# Patient Record
Sex: MALE | Race: WHITE | NOT HISPANIC OR LATINO | Employment: FULL TIME | URBAN - METROPOLITAN AREA
[De-identification: names, ages, dates, MRNs, and addresses within clinical notes are randomized per-mention and may not be internally consistent; named-entity substitution may affect disease eponyms.]

---

## 2023-03-19 ENCOUNTER — APPOINTMENT (OUTPATIENT)
Dept: CT IMAGING | Facility: CLINIC | Age: 43
DRG: 965 | End: 2023-03-19
Attending: EMERGENCY MEDICINE
Payer: COMMERCIAL

## 2023-03-19 ENCOUNTER — HOSPITAL ENCOUNTER (INPATIENT)
Facility: CLINIC | Age: 43
LOS: 2 days | Discharge: HOME | DRG: 965 | End: 2023-03-22
Attending: EMERGENCY MEDICINE | Admitting: STUDENT IN AN ORGANIZED HEALTH CARE EDUCATION/TRAINING PROGRAM
Payer: COMMERCIAL

## 2023-03-19 ENCOUNTER — APPOINTMENT (OUTPATIENT)
Dept: MRI IMAGING | Facility: CLINIC | Age: 43
DRG: 965 | End: 2023-03-19
Attending: NURSE PRACTITIONER
Payer: COMMERCIAL

## 2023-03-19 DIAGNOSIS — S22.061A CLOSED STABLE BURST FRACTURE OF EIGHTH THORACIC VERTEBRA, INITIAL ENCOUNTER (H): ICD-10-CM
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
## 2023-03-19 PROCEDURE — 250N000013 HC RX MED GY IP 250 OP 250 PS 637: Performed by: PHYSICIAN ASSISTANT

## 2023-03-19 PROCEDURE — 96375 TX/PRO/DX INJ NEW DRUG ADDON: CPT

## 2023-03-19 PROCEDURE — 99221 1ST HOSP IP/OBS SF/LOW 40: CPT | Performed by: NURSE PRACTITIONER

## 2023-03-19 PROCEDURE — G0378 HOSPITAL OBSERVATION PER HR: HCPCS

## 2023-03-19 PROCEDURE — 250N000013 HC RX MED GY IP 250 OP 250 PS 637: Performed by: EMERGENCY MEDICINE

## 2023-03-19 PROCEDURE — 99285 EMERGENCY DEPT VISIT HI MDM: CPT | Mod: 25

## 2023-03-19 PROCEDURE — 96376 TX/PRO/DX INJ SAME DRUG ADON: CPT

## 2023-03-19 PROCEDURE — 250N000011 HC RX IP 250 OP 636: Performed by: PHYSICIAN ASSISTANT

## 2023-03-19 PROCEDURE — 71260 CT THORAX DX C+: CPT

## 2023-03-19 PROCEDURE — 99223 1ST HOSP IP/OBS HIGH 75: CPT | Mod: AI | Performed by: PHYSICIAN ASSISTANT

## 2023-03-19 PROCEDURE — 72146 MRI CHEST SPINE W/O DYE: CPT

## 2023-03-19 PROCEDURE — 250N000011 HC RX IP 250 OP 636: Performed by: EMERGENCY MEDICINE

## 2023-03-19 PROCEDURE — 96374 THER/PROPH/DIAG INJ IV PUSH: CPT | Mod: 59

## 2023-03-19 PROCEDURE — 72128 CT CHEST SPINE W/O DYE: CPT

## 2023-03-19 PROCEDURE — 250N000009 HC RX 250: Performed by: EMERGENCY MEDICINE

## 2023-03-19 RX ORDER — IOPAMIDOL 755 MG/ML
500 INJECTION, SOLUTION INTRAVASCULAR ONCE
Status: COMPLETED | OUTPATIENT
Start: 2023-03-19 | End: 2023-03-19

## 2023-03-19 RX ORDER — IBUPROFEN 200 MG
200 TABLET ORAL EVERY 6 HOURS PRN
Status: ON HOLD | COMMUNITY
End: 2023-03-22

## 2023-03-19 RX ORDER — PROCHLORPERAZINE 25 MG
25 SUPPOSITORY, RECTAL RECTAL EVERY 12 HOURS PRN
Status: DISCONTINUED | OUTPATIENT
Start: 2023-03-19 | End: 2023-03-22 | Stop reason: HOSPADM

## 2023-03-19 RX ORDER — HYDROMORPHONE HYDROCHLORIDE 1 MG/ML
0.3 INJECTION, SOLUTION INTRAMUSCULAR; INTRAVENOUS; SUBCUTANEOUS EVERY 4 HOURS PRN
Status: DISCONTINUED | OUTPATIENT
Start: 2023-03-19 | End: 2023-03-22

## 2023-03-19 RX ORDER — AMOXICILLIN 250 MG
1 CAPSULE ORAL 2 TIMES DAILY
Status: DISCONTINUED | OUTPATIENT
Start: 2023-03-20 | End: 2023-03-22 | Stop reason: HOSPADM

## 2023-03-19 RX ORDER — ACETAMINOPHEN 500 MG
1000 TABLET ORAL ONCE
Status: COMPLETED | OUTPATIENT
Start: 2023-03-19 | End: 2023-03-19

## 2023-03-19 RX ORDER — PROCHLORPERAZINE MALEATE 5 MG
10 TABLET ORAL EVERY 6 HOURS PRN
Status: DISCONTINUED | OUTPATIENT
Start: 2023-03-19 | End: 2023-03-22 | Stop reason: HOSPADM

## 2023-03-19 RX ORDER — KETOROLAC TROMETHAMINE 15 MG/ML
15 INJECTION, SOLUTION INTRAMUSCULAR; INTRAVENOUS ONCE
Status: COMPLETED | OUTPATIENT
Start: 2023-03-19 | End: 2023-03-19

## 2023-03-19 RX ORDER — CYCLOBENZAPRINE HCL 10 MG
10 TABLET ORAL ONCE
Status: COMPLETED | OUTPATIENT
Start: 2023-03-19 | End: 2023-03-19

## 2023-03-19 RX ORDER — CYCLOBENZAPRINE HCL 10 MG
10 TABLET ORAL 3 TIMES DAILY
Status: DISCONTINUED | OUTPATIENT
Start: 2023-03-20 | End: 2023-03-22 | Stop reason: HOSPADM

## 2023-03-19 RX ORDER — POLYETHYLENE GLYCOL 3350 17 G/17G
17 POWDER, FOR SOLUTION ORAL DAILY
Status: DISCONTINUED | OUTPATIENT
Start: 2023-03-20 | End: 2023-03-22 | Stop reason: HOSPADM

## 2023-03-19 RX ORDER — OXYCODONE HYDROCHLORIDE 5 MG/1
5-10 TABLET ORAL ONCE
Status: COMPLETED | OUTPATIENT
Start: 2023-03-19 | End: 2023-03-19

## 2023-03-19 RX ORDER — ACETAMINOPHEN 500 MG
1000 TABLET ORAL EVERY 8 HOURS
Status: DISCONTINUED | OUTPATIENT
Start: 2023-03-20 | End: 2023-03-22 | Stop reason: HOSPADM

## 2023-03-19 RX ORDER — ONDANSETRON 4 MG/1
4 TABLET, ORALLY DISINTEGRATING ORAL EVERY 6 HOURS PRN
Status: DISCONTINUED | OUTPATIENT
Start: 2023-03-19 | End: 2023-03-22 | Stop reason: HOSPADM

## 2023-03-19 RX ORDER — OXYCODONE HYDROCHLORIDE 5 MG/1
5-10 TABLET ORAL EVERY 4 HOURS PRN
Status: DISCONTINUED | OUTPATIENT
Start: 2023-03-19 | End: 2023-03-22 | Stop reason: HOSPADM

## 2023-03-19 RX ORDER — AMOXICILLIN 250 MG
2 CAPSULE ORAL 2 TIMES DAILY
Status: DISCONTINUED | OUTPATIENT
Start: 2023-03-20 | End: 2023-03-22 | Stop reason: HOSPADM

## 2023-03-19 RX ORDER — HYDROMORPHONE HYDROCHLORIDE 1 MG/ML
0.5 INJECTION, SOLUTION INTRAMUSCULAR; INTRAVENOUS; SUBCUTANEOUS ONCE
Status: COMPLETED | OUTPATIENT
Start: 2023-03-19 | End: 2023-03-19

## 2023-03-19 RX ORDER — ONDANSETRON 2 MG/ML
4 INJECTION INTRAMUSCULAR; INTRAVENOUS EVERY 6 HOURS PRN
Status: DISCONTINUED | OUTPATIENT
Start: 2023-03-19 | End: 2023-03-22 | Stop reason: HOSPADM

## 2023-03-19 RX ORDER — KETOROLAC TROMETHAMINE 30 MG/ML
30 INJECTION, SOLUTION INTRAMUSCULAR; INTRAVENOUS EVERY 8 HOURS
Status: DISCONTINUED | OUTPATIENT
Start: 2023-03-20 | End: 2023-03-20

## 2023-03-19 RX ADMIN — HYDROMORPHONE HYDROCHLORIDE 0.5 MG: 1 INJECTION, SOLUTION INTRAMUSCULAR; INTRAVENOUS; SUBCUTANEOUS at 13:36

## 2023-03-19 RX ADMIN — ACETAMINOPHEN 1000 MG: 500 TABLET ORAL at 13:35

## 2023-03-19 RX ADMIN — CYCLOBENZAPRINE 10 MG: 10 TABLET, FILM COATED ORAL at 13:35

## 2023-03-19 RX ADMIN — HYDROMORPHONE HYDROCHLORIDE 0.3 MG: 1 INJECTION, SOLUTION INTRAMUSCULAR; INTRAVENOUS; SUBCUTANEOUS at 22:27

## 2023-03-19 RX ADMIN — OXYCODONE HYDROCHLORIDE 10 MG: 5 TABLET ORAL at 18:40

## 2023-03-19 RX ADMIN — IOPAMIDOL 100 ML: 755 INJECTION, SOLUTION INTRAVENOUS at 14:31

## 2023-03-19 RX ADMIN — CYCLOBENZAPRINE 10 MG: 10 TABLET, FILM COATED ORAL at 19:48

## 2023-03-19 RX ADMIN — OXYCODONE HYDROCHLORIDE 10 MG: 5 TABLET ORAL at 22:21

## 2023-03-19 RX ADMIN — SODIUM CHLORIDE 63 ML: 9 INJECTION, SOLUTION INTRAVENOUS at 14:31

## 2023-03-19 RX ADMIN — KETOROLAC TROMETHAMINE 15 MG: 15 INJECTION, SOLUTION INTRAMUSCULAR; INTRAVENOUS at 13:35

## 2023-03-19 ASSESSMENT — ENCOUNTER SYMPTOMS
NUMBNESS: 0
HEADACHES: 0
BACK PAIN: 1
VOMITING: 0
NECK PAIN: 0

## 2023-03-19 ASSESSMENT — ACTIVITIES OF DAILY LIVING (ADL)
ADLS_ACUITY_SCORE: 39
ADLS_ACUITY_SCORE: 35
ADLS_ACUITY_SCORE: 39

## 2023-03-19 NOTE — ED TRIAGE NOTES
Pt arrives via EMS from [facility] for a fall while skiing. Reports the snow is very icy and he lost control. Complains of pain in thoracic area pain in his back and right rib pain. History of L4-L5 back surgery. EMS placed 18G left AC and gave 0.5mg dilaudid.

## 2023-03-19 NOTE — ED PROVIDER NOTES
History     Chief Complaint:  Back Pain     The history is provided by the patient and the spouse.      Itz Jurado is a healthy 42 year old male who presents with his wife for evaluation of back pain. Boris was skiing today and fell, landing on his buttocks then his back. He was wearing a helmet and did not hit his head or lose consciousness. He immediately had midline thoracic back pain, worse with movement or deep inspiration. This pain is 9/10 after 0.5 mg of Dilaudid from paramedics. He has not ambulated since the injury. He denies numbness, tingling, headache, neck pain, vision changes, or vomiting.     Independent Historian:    Spouse/partner    Review of External Notes: No records available      ROS:  Review of Systems   Constitutional: Negative for activity change.   Eyes: Negative for visual disturbance.   Gastrointestinal: Negative for vomiting.   Musculoskeletal: Positive for back pain. Negative for neck pain.   Skin: Negative for wound.   Neurological: Negative for syncope, numbness and headaches.   All other systems reviewed and are negative.    Allergies:  Morphine  Penicillins     Medications:    The patient is currently on no regular medications.    Past Medical History:    No significant past medical history    Past Surgical History:    L4-L5 fusion      Social History:  The patient presents to the ED via EMS  Wife is at bedside.  They have 2 children.  Enjoys skiing   He moved here in 2020 and has not established primary care    Physical Exam     Patient Vitals for the past 24 hrs:   BP Temp Temp src Pulse Resp SpO2 Height Weight   03/19/23 2030 115/69 -- -- 87 -- -- -- --   03/19/23 2000 138/88 -- -- 84 -- 96 % -- --   03/19/23 1930 132/88 -- -- 86 -- -- -- --   03/19/23 1900 135/82 -- -- 84 -- 97 % -- --   03/19/23 1850 -- -- -- -- -- 94 % -- --   03/19/23 1848 -- -- -- -- -- 96 % -- --   03/19/23 1847 -- -- -- -- -- 95 % -- --   03/19/23 1846 -- -- -- -- -- 95 % -- --   03/19/23 1845 --  "-- -- -- -- 95 % -- --   03/19/23 1844 -- -- -- -- -- 95 % -- --   03/19/23 1843 -- -- -- -- -- 97 % -- --   03/19/23 1841 136/80 -- -- 81 -- 97 % -- --   03/19/23 1730 -- -- -- -- -- 98 % -- --   03/19/23 1729 -- -- -- -- -- 98 % -- --   03/19/23 1728 -- -- -- -- -- 98 % -- --   03/19/23 1727 -- -- -- -- -- 98 % -- --   03/19/23 1726 -- -- -- -- -- 97 % -- --   03/19/23 1724 -- -- -- -- -- 98 % -- --   03/19/23 1723 -- -- -- -- -- 98 % -- --   03/19/23 1722 -- -- -- -- -- 98 % -- --   03/19/23 1703 -- -- -- -- -- 97 % -- --   03/19/23 1702 -- -- -- -- -- 97 % -- --   03/19/23 1701 -- -- -- -- -- 97 % -- --   03/19/23 1700 134/87 -- -- 79 -- 97 % -- --   03/19/23 1545 132/83 -- -- 68 -- 97 % -- --   03/19/23 1343 -- -- -- -- -- 94 % -- --   03/19/23 1342 -- 97.6  F (36.4  C) Oral -- -- 95 % -- --   03/19/23 1341 (!) 140/94 -- -- 71 -- 95 % -- --   03/19/23 1305 (!) 136/93 (!) 96.7  F (35.9  C) Temporal 64 18 98 % 1.93 m (6' 4\") 111.1 kg (245 lb)      Physical Exam  General: Well-developed and well-nourished.  Uncomfortable appearing middle-aged  man. Cooperative.  Head:  Atraumatic.  Eyes:  Conjunctivae, lids, and sclerae are normal.  ENT:    Normal nose. Moist mucous membranes.  Neck:  Supple. Normal range of motion.  No midline tenderness.  CV:  Regular rate and rhythm. Normal heart sounds with no murmurs, rubs, or gallops detected.  Resp:  No respiratory distress. Clear to auscultation bilaterally without decreased breath sounds, wheezing, rales, or rhonchi.  GI:  Soft. Non-distended. Non-tender.    MS:  Normal ROM. No bilateral lower extremity edema.  No reproducible tenderness to the posterior ribs bilaterally.  No step-offs.  Midline tenderness to the mid thoracic spine without lumbar spine tenderness.  Skin:  Warm. Non-diaphoretic. No pallor.  Neuro: Awake. A&Ox3. Normal strength.  Normal sensation to light touch throughout including the thoracoabdominal wall.  Psych: Normal mood and affect. Normal " speech.  Vitals reviewed.    Emergency Department Course   Imaging:  Chest CT w IV contrast only, TRAUMA / DISSECTION   Final Result   IMPRESSION:    1.  Please separate report of the thoracic spine CT for details about the T8 vertebral body fracture and prevertebral soft tissue swelling.   2.  Mild linear and patchy opacities in the right lung apex and posterior right lung, may be due to atelectasis and possible pulmonary contusion.   3.  No other acute findings or traumatic injury in the chest.      CT Thoracic Spine w/o Contrast   Final Result   IMPRESSION:   1.  Acute burst fracture of T8 with surrounding perivertebral soft tissue injury. Recommend spine surgery consultation.   2.  Mild osseous retropulsion without high-grade spinal canal stenosis. Mild-moderate T8-T9 foraminal foraminal narrowing.             MR Thoracic Spine w/o Contrast    (Results Pending)   XR Thoracic Spine 2 Views    (Results Pending)     Report per radiology    Emergency Department Course & Assessments:  Interventions:  Medications   oxyCODONE (ROXICODONE) tablet 5-10 mg (10 mg Oral $Given 3/19/23 2221)   HYDROmorphone (PF) (DILAUDID) injection 0.3 mg (0.3 mg Intravenous $Given 3/19/23 2227)   cyclobenzaprine (FLEXERIL) tablet 10 mg (10 mg Oral $Given 3/19/23 1335)   HYDROmorphone (PF) (DILAUDID) injection 0.5 mg (0.5 mg Intravenous $Given 3/19/23 1336)   acetaminophen (TYLENOL) tablet 1,000 mg (1,000 mg Oral $Given 3/19/23 1335)   ketorolac (TORADOL) injection 15 mg (15 mg Intravenous $Given 3/19/23 1335)   oxyCODONE (ROXICODONE) tablet 5-10 mg (10 mg Oral $Given 3/19/23 1840)   cyclobenzaprine (FLEXERIL) tablet 10 mg (10 mg Oral $Given 3/19/23 1948)      Independent Interpretation (X-rays, CTs, rhythm strip):  Independently reviewing CT scans, appears to be T-8 compression fracture with no significant retropulsion.     Consultations/Discussion of Management or Tests:  1610 I spoke with Gisella Mcneal NP of the neurosurgery service,  regarding patient's presentation, findings, and plan of care.     1715 I spoke with Gisella Mcneal again.    1907  I spoke with Gisella Mcneal regarding plan for TLSO.    1924 I spoke with SHARONA Ambrose of the hospitalist service, regarding patient's presentation, findings, and plan of care.     2007 I spoke with Gisella Mcneal regarding plan for MRI.    Social Determinants of Health affecting care:  , has kids.  No established care providers.     Assessments:  1320 I examined the patient and obtained history.     1602 I rechecked Boris, updated on CT results. His pain was improved to a 5/10.     1910 Rechecked Boris. He would prefer to be admitted to the hospital for pain control.     Disposition:  The patient was admitted to the hospital under the care of Dr. Solis.     Impression & Plan    Medical Decision Making:  Boris is a healthy 42-year-old man who had immediate onset of midline thoracic back pain after he fell onto his buttocks and back while skiing just prior to arrival.  He denies any other injury including head injury.  He appears uncomfortable, though well, on exam.  He does have midline tenderness to the mid thoracic spine without significant tenderness elsewhere including over the ribs or lumbar spine.  He is neurologically intact without change to strength or sensation.    There are some linear patchy opacities in the right lung on chest CT which may be pulmonary contusion.  The more notable finding is thoracic spine CT revealing a burst fracture of T8 with surrounding paravertebral soft tissue injury with mild osseous retropulsion without high-grade spinal canal stenosis.  I contacted neurosurgery NP Gisella Mcneal who evaluated the patient in the emergency department.  She recommended TLSO followed by upright x-ray and follow-up in their clinic in 6 weeks.  Later she also recommended MRI of the thoracic spine.  We will not be able to facilitate completion of TLSO and MRI in the  emergency department tonight.  Boris agrees with plan for admission to complete these tasks and for pain control as his pain persists despite oxycodone, Dilaudid, Flexeril, Tylenol, and Toradol.  I updated him and his wife and answered all their questions.  I spoke with Carmela Diaz, hospitalist PA, who accepts admission and has no further orders.    Diagnosis:    ICD-10-CM    1. Injury while downhill skiing  Y93.23       2. Closed stable burst fracture of eighth thoracic vertebra, initial encounter (H)  S22.061A          Scribe Disclosure:  I, Yosef Tabares, am serving as a scribe at 1:28 PM on 3/19/2023 to document services personally performed by Rach Reeves MD based on my observations and the provider's statements to me.    3/19/2023   Rach Reeves MD Dixson, Kylie S, MD  03/20/23 0952       Rach Reeves MD  03/20/23 0953

## 2023-03-19 NOTE — ED NOTES
Assisted with Pt. Ambulance triage. Applied monitoring devices (BP, and pulse ox) onto Patient.

## 2023-03-20 ENCOUNTER — DOCUMENTATION ONLY (OUTPATIENT)
Dept: ORTHOPEDICS | Facility: CLINIC | Age: 43
End: 2023-03-20
Payer: COMMERCIAL

## 2023-03-20 PROBLEM — S06.4XAA EPIDURAL HEMATOMA (H): Status: ACTIVE | Noted: 2023-03-20

## 2023-03-20 PROCEDURE — 120N000001 HC R&B MED SURG/OB

## 2023-03-20 PROCEDURE — 250N000013 HC RX MED GY IP 250 OP 250 PS 637: Performed by: PHYSICIAN ASSISTANT

## 2023-03-20 PROCEDURE — 250N000011 HC RX IP 250 OP 636: Performed by: PHYSICIAN ASSISTANT

## 2023-03-20 PROCEDURE — 99231 SBSQ HOSP IP/OBS SF/LOW 25: CPT | Performed by: PHYSICIAN ASSISTANT

## 2023-03-20 PROCEDURE — 96376 TX/PRO/DX INJ SAME DRUG ADON: CPT

## 2023-03-20 PROCEDURE — G0378 HOSPITAL OBSERVATION PER HR: HCPCS

## 2023-03-20 RX ADMIN — OXYCODONE HYDROCHLORIDE 10 MG: 5 TABLET ORAL at 20:56

## 2023-03-20 RX ADMIN — ACETAMINOPHEN 1000 MG: 500 TABLET ORAL at 23:51

## 2023-03-20 RX ADMIN — CYCLOBENZAPRINE 10 MG: 10 TABLET, FILM COATED ORAL at 08:51

## 2023-03-20 RX ADMIN — ACETAMINOPHEN 1000 MG: 500 TABLET ORAL at 08:51

## 2023-03-20 RX ADMIN — ACETAMINOPHEN 1000 MG: 500 TABLET ORAL at 16:09

## 2023-03-20 RX ADMIN — OXYCODONE HYDROCHLORIDE 10 MG: 5 TABLET ORAL at 03:18

## 2023-03-20 RX ADMIN — HYDROMORPHONE HYDROCHLORIDE 0.3 MG: 1 INJECTION, SOLUTION INTRAMUSCULAR; INTRAVENOUS; SUBCUTANEOUS at 06:03

## 2023-03-20 RX ADMIN — CYCLOBENZAPRINE 10 MG: 10 TABLET, FILM COATED ORAL at 13:18

## 2023-03-20 RX ADMIN — KETOROLAC TROMETHAMINE 30 MG: 30 INJECTION, SOLUTION INTRAMUSCULAR; INTRAVENOUS at 00:16

## 2023-03-20 RX ADMIN — OXYCODONE HYDROCHLORIDE 5 MG: 5 TABLET ORAL at 11:44

## 2023-03-20 RX ADMIN — OXYCODONE HYDROCHLORIDE 10 MG: 5 TABLET ORAL at 16:34

## 2023-03-20 RX ADMIN — ACETAMINOPHEN 1000 MG: 500 TABLET ORAL at 00:16

## 2023-03-20 RX ADMIN — CYCLOBENZAPRINE 10 MG: 10 TABLET, FILM COATED ORAL at 20:57

## 2023-03-20 RX ADMIN — HYDROMORPHONE HYDROCHLORIDE 0.3 MG: 1 INJECTION, SOLUTION INTRAMUSCULAR; INTRAVENOUS; SUBCUTANEOUS at 14:40

## 2023-03-20 ASSESSMENT — ENCOUNTER SYMPTOMS
ACTIVITY CHANGE: 0
WOUND: 0

## 2023-03-20 ASSESSMENT — ACTIVITIES OF DAILY LIVING (ADL)
ADLS_ACUITY_SCORE: 26

## 2023-03-20 NOTE — PROGRESS NOTES
S: Boris Jurado was seen at [facility], Room 233-01, for the evaluation and measurements for a custom TLSO.     Dx: T8 Burst Fracture    O: A Custom TLSO is medically necessary and recommended by Neurosurgery due to severity and instability of spinal fracture.     A: Measurements were completed without incident. Custom [employer] TLSO with a Flex Foam liner and rigid thermoplastic external frame.      P: The TLSO will be fabricated and delivered, pending any unforeseen circumstances, by Tuesday afternoon (03/21/2022).  If possible, the TLSO will be delivered earlier.    At the time of delivery, the TLSO will be fit to the patient and all donning/doffing and care instructions will be provided to the patient.    G: The TLSO increases medial-lateral, rotational and anterior-posterior stability of the spine.  The TLSO also applies compression to the patient s soft tissues.  Compression of the patient s soft tissues serves to unload the injured vertebrae which reduces pain and promotes healing.  The goal of this orthosis is to provide spinal stability, promote healing and reduce pain while the patient performs their ADLs.    Electronically signed by Rei Alexander CPO, ANTOINE    How to Put on a TLSO Back Brace  For optimal fit brace should NOT be donned with patient sitting. Ideal fit is achieved by donning in either laying or standing position. Due to changes in belly tissue, it is difficult to achieve a proper fit when patient is sitting.  1. Apply a snug-fitting cotton t-shirt.  Loose shirts may cause wrinkles and skin irritation.  2. Patient should be supine. Palpate for waist (below ribs but above hips) so you know where to line up waist grooves of the brace.  3. Logroll patient and slide back section of brace under patient lining up waist groove of brace with patient's waist.  4. Roll patient onto their back with the posterior section behind them. Recheck alignment of waist groove on brace with patient's  waist. It may be necessary to logroll patient to the opposite side to get posterior section centered on patient, where it extends anteriorly equal amounts on patient's sides. Repeat logrolling side to side as necessary.  5. Once posterior section is positioned correctly, lay anterior section on patient. Again, line up waist groove of brace to patient's waist. Waist grooves of anterior and posterior section should match up and fit together. Anterior shell should go over the top of posterior shell. Velcro straps should line up with the chafes/D-rings, if they don't, either the anterior or the posterior sections are not in the proper place.  6. Fasten the middle straps first and tighten both sides evenly. Then fasten either top or bottom straps in the same manner. Brace should be snug so as to prevent brace from sliding up on patient. If it is too loose, the brace will slide up and cause discomfort to the patient in the chin and/or axilla area.  7. When properly fit, brace the inferior aspect should allow clearance so as not to cut into the tops of the thighs when sitting but needs to be as low on the pelvic area as possible.    Wearing Schedule  Always check with prescribing doctor for a precise wearing schedule. At times the TLSO is worn only when out of bed while sitting or standing. Other times, the doctor requires the TLSO to be worn at all times.    Cleaning and Maintenance  Rubbing alcohol may be used to clean the inside and outside of the TLSO. Spray TLSO with alcohol and wipe gently with a cloth. Be sure that TLSO is completely dry prior to application to ensure no skin issues will occur. Always wear a clean, dry, snug-fitting shirt under the brace.    Tips and Problem Solving  Brace migration is inevitable, especially when patient is going from laying to upright in bed. The mattress may push posterior section of brace up, which will push the whole brace up. Migration may also occur when patient sits in a soft  cushioned chair. Don't be afraid to readjust brace and pull it down and back to its proper position.    Avoid soft seat cushioned chairs and sit up straight. Soft seat cushions or leaning back into a chair will cause the brace to migrate upward and may place pressure underarms.      Instead of leaning back, try sitting on the front half of the seat of the chair and work your way back until you contact the backrest of the chair.  Sometimes using a pillow in the gap between the back of the chair and the brace will provide more support.    Do not lean forward over a table while eating. Bring the food up to the mouth. This will reduce any pressure on the thighs and chest.    If the TLSO starts to migrate upward under the throat/armpits, ensure that straps are snug. Straps that are loose will allow the brace to shift.

## 2023-03-20 NOTE — PLAN OF CARE
ROOM # 233    Living Situation : Independent @Home  Facility name:  : [facility] (wife)    Activity level at baseline: IND  Activity level on admit: A X 2    Who will be transporting you at discharge: Wife    Patient registered to observation; given Patient Bill of Rights; given the opportunity to ask questions about observation status and their plan of care.  Patient has been oriented to the observation room, bathroom and call light is in place.    Discussed discharge goals and expectations with patient/family.

## 2023-03-20 NOTE — PROGRESS NOTES
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 He presented with back pain, imaging confirms acute burst fracture of T8 vertebral body.     Acute medical issues:  Thoracic Burst Fracture T8  Perivertebral Hematoma   Immediate back pain following fall on buttocks at moderate speed down hill skiing. Fortunately intact neurologic status.  He was wearing helmet, no head injury or pain elsewhere.   CT thoracic spine with Acute burst fracture of T8 vertebral body with mild retropulsion of posterior cortex, moderate anteropulsion, and breech through the endplates with approximately 60% height loss relative to the noncompressed adjacent vertebrae. Traumatic perivertebral swelling and hematoma spanning from T6 through T9.     -- Admitted to Obs  -- NS consult. Appreciate their assistance. Planning for nonoperative management for now.  Follow up with NS in 6 weeks as OP with repeat plain imaging.  -- Orthotics consult for TLSO (ordered 3/20/2023) due to be delivered 3/21/23.  Will obtain imaging at that time to ensure stability and then have PT evaluate.    -- BR for now. May elevate HOB to 20 degrees when eating, etc.    -- Neuro checks per protocol.    -- Multimodal pain management.     Urinary retention  Required I/O cath last pm for urinary retention. Due to void.  Does not correlate with fracture.  No urinary or bowel incontinence or priapism.  -- Monitor. I/O cath PRN.     Pulmonary contusion  CT chest with mild linear and patch opacities in the right lung apex and posterior right lung concerning for atelectasis vs pulmonary contusion. Not hypoxic.  No respiratory issues at present.    -- Pulmonary toilet.    -- PRN O2.            Diet: Regular Diet Adult    DVT Prophylaxis: High risk for " "bleeding. PADUA score 3  Rich Catheter: Not present  Lines: None     Cardiac Monitoring: None  Code Status: Full Code      Clinically Significant Risk Factors Present on Admission                       # Overweight: Estimated body mass index is 29.58 kg/m  as calculated from the following:    Height as of this encounter: 1.93 m (6' 4\").    Weight as of this encounter: 110.2 kg (243 lb).    Complicates cares.        Disposition Plan  pending TLSO brace and mobility/pain control.          The patient's care was discussed with the Bedside Nurse, Care Coordinator/, Patient, Patient's Family and NS Consultant(s).    BABATUNDE [facility]  Hospitalist Service  [facility]  Securely message with [employer] (more info)  Text page via [employer] Paging/Directory   ______________________________________________________________________    Interval History   No CP or SOB.  No new neurological deficits.  Highly motivated. Pain control appropriate.    Fitted with customized TLSO brace with final fitting (don/doff) in the next day or so.    Has not urinated since yesterday after being I/O cath but does endorse minimal PO intake. Encourage PO intake and monitor.  Anticipate as mobility increases, retention and decreased urinary output issues will resolve.d     Physical Exam   Vital Signs: Temp: 97.8  F (36.6  C) Temp src: Oral BP: 125/73 Pulse: 73   Resp: 16 SpO2: 94 % O2 Device: None (Room air)    Weight: 243 lbs 0 oz    GEN:   Alert, oriented x 3, appears comfortable, NAD.  NECK:   Supple ,no mass or thyromegaly   HEENT:  Normocephalic/atraumatic, no scleral icterus, no nasal discharge, mouth moist.  CV:   Regular rate and rhythm, no murmur or JVD.  S1 + S2 noted, no S3 or S4.  LUNGS:   Clear to auscultation bilaterally without rales/rhonchi/wheezing/retractions.  Symmetric chest rise on inhalation noted.  ABD:   Active bowel sounds, soft, non-tender/non-distended.  No " rebound/guarding/rigidity.  EXT:   No edema.  No cyanosis.  No joint synovitis noted.  SKIN:   Dry to touch, no exanthems noted in the visualized areas.  Neurologic: Grossly intact,non focal. Spine: TTP T8.  ALVAREZ, 5/5 strength BLE.  Negative ankle clonus.   Neuropsychiatric:  General: normal, calm and normal eye contact  Level of consciousness: alert / normal  Affect: normal  Orientation: oriented to self, place, time and situation     Medical Decision Making       45 MINUTES SPENT BY ME on the date of service doing chart review, history, exam, documentation & further activities per the note.      Data         Imaging results reviewed over the past 24 hrs:   Recent Results (from the past 24 hour(s))   CT Thoracic Spine w/o Contrast    Narrative    EXAM: CT THORACIC SPINE W/O CONTRAST  LOCATION: [facility]  DATE/TIME: 3/19/2023 2:46 PM    INDICATION: Trauma fall.  COMPARISON: None.  TECHNIQUE: Routine CT Thoracic Spine without IV contrast. Multiplanar reformats. Dose reduction techniques were used.     FINDINGS:  VERTEBRA: Mild upper thoracic dextrocurvature and straightening of kyphosis is most likely related to positioning and/or muscle spasm. Acute burst fracture of T8 vertebral body with mild retropulsion of posterior cortex, moderate anteropulsion, and   breech through the endplates with approximately 60% height loss relative to the noncompressed adjacent vertebrae. Other vertebral body heights are intact. Facets are intact.    CANAL/FORAMINA: Disc spaces are maintained. Probable indentation of the ventral thecal sac at T8. Mild left and moderate right foraminal stenosis at T8-T9. No high-grade spinal canal stenosis.    PARASPINAL: Traumatic perivertebral swelling and hematoma spanning from T6 through T9. Please refer to the separately dictated CT report regarding details of the chest .      Impression    IMPRESSION:  1.  Acute burst fracture of T8 with surrounding perivertebral soft tissue  injury. Recommend spine surgery consultation.  2.  Mild osseous retropulsion without high-grade spinal canal stenosis. Mild-moderate T8-T9 foraminal foraminal narrowing.        Chest CT w IV contrast only, TRAUMA / DISSECTION    Narrative    EXAM: CT CHEST W CONTRAST  LOCATION: [facility]  DATE/TIME: 3/19/2023 2:47 PM    INDICATION: chest rib pain after fall skiing, tenderness posterolateral right inferior ribs   eval fx ptx, etc  COMPARISON: None.  TECHNIQUE: CT chest with IV contrast. Multiplanar reformats were obtained. Dose reduction techniques were used.    CONTRAST: 100mL Isovue 370    FINDINGS:   LUNGS AND PLEURA: Linear and patchy opacities in the posterior right lung and right lung apex, likely atelectasis and possible pulmonary contusion. No pneumothorax, hemothorax or pleural effusion. Mild left basilar atelectasis. No suspicious pulmonary   nodules or masses.    MEDIASTINUM/AXILLAE: No lymphadenopathy. No mediastinal hematoma. No central pulmonary emboli. No thoracic aortic aneurysm or traumatic aortic injury. Prevertebral soft tissue thickening at the T8 vertebral level, related to the T8 vertebral body   fracture.    CORONARY ARTERY CALCIFICATION: Mild.    UPPER ABDOMEN: No significant finding.    MUSCULOSKELETAL: Please see report of the thoracic spine CT for details about the T8 vertebral body fracture. No other fractures visualized..      Impression    IMPRESSION:   1.  Please separate report of the thoracic spine CT for details about the T8 vertebral body fracture and prevertebral soft tissue swelling.  2.  Mild linear and patchy opacities in the right lung apex and posterior right lung, may be due to atelectasis and possible pulmonary contusion.  3.  No other acute findings or traumatic injury in the chest.   MR Thoracic Spine w/o Contrast    Narrative    EXAM: MR THORACIC SPINE W/O CONTRAST  LOCATION: [facility]  DATE/TIME: 3/19/2023 11:35  PM    INDICATION: T8 fracture, radiating pain to right ribs.  COMPARISON: CT thoracic spine from same day.  TECHNIQUE: Routine Thoracic Spine MRI without IV contrast.    FINDINGS:   Again seen is an acute burst type compression fracture deformity with marrow edema obliquely tracking across the T8 vertebral body. There is stable appearing ventral and dorsal osseous retropulsion. Dorsal osseous retropulsion extends approximately 4 mm   into the spinal canal where the dorsal osseous fragment contacts the ventral aspect of the thoracic spinal cord and causes mild to moderate central spinal canal stenosis. No definite associated spinal cord edema is present at this level. Below the level   of T8 there is subtle increased signal within the central spinal cord, suggesting prominence of the central canal. There is a thin amount of ventral epidural hematoma at the level of T8 measuring up to 2 mm in AP dimensions and approximately 3.1 cm in   craniocaudal dimensions. The epidural hematoma extends down to the level of the inferior T9 vertebral body ventrally. Epidural hematoma appears asymmetrically prominent to the left at this level there does also noted dorsally, extending down to level of   T10 and superiorly up to the level of T6. There is mild associated thecal sac stenosis.    Along the posterior vertebral body of T8, there is thinning of the posterior longitudinal ligament without reena disruption identified. The anterior longitudinal ligament at T8 is not well appreciated.    Marrow edema is noted tracking into the left pedicle at T8 and base of the right pedicle at T8. Additionally, there is a small amount of posttraumatic marrow edema identified involving the inferolateral right posterior corner of T7 and anterior/inferior   corner of T7.    Bilateral anterolateral paraspinal edema/hematoma is present centered at T8.    Edema is noted involving the T8-T9 interspinous space, suggesting interspinous ligamentous  injury.    A small amount of fluid signal is identified involving the bilateral T8-T9 facet joints, possibly reflecting capsular injury. Small amount of edema is also noted involving bilateral T7-T8 facet joints.    The remaining vertebral body heights and marrow signal pattern are maintained and normal for patient's age.     Mild multilevel disc desiccation and disc height loss. No disc herniations identified. No high-grade foraminal stenosis identified on the left. On the right at T8-T9 there is moderate neural foraminal stenosis. No high-grade spinal canal stenosis on a   degenerative basis.     Bilateral pleural fluid collections. Nonspecific patchy opacity involving the right lower lobe.    Subcentimeter cystic lesion within the dorsal right hepatic lobe likely reflects a biliary cyst versus hamartoma in the absence of known malignancy.      Impression    IMPRESSION:  1.  Stable burst type compression fracture deformity at T8 with persistent dorsal osseous retropulsion causing mild to moderate central spinal canal stenosis.  2.  Epidural hematoma centered at T8 causing mild thecal sac stenosis.  3.  Mild posttraumatic edema along the inferior endplate of T7.  4.  Interspinous ligamentous injury at T8-T9.  5.  No definite abnormal spinal cord signal.  6.  Additional findings above.        Dr. Shiv Beltre discussed results with Dr. Covarrubias on 03/20/2023 at 1:08 AM.

## 2023-03-20 NOTE — PROGRESS NOTES
Patient is a 42-year-old male with history of L4-5 fusion who was admitted earlier this evening following a injury sustained during skiing.  Presenting with back pain and found to have an acute burst fracture of T8 vertebral body on CT thoracic spine.  Cross cover notified of abnormal MRI and I spoke with Dr. Beltre from [place] [facility] regarding the findings.  He stated that there is an epidural hematoma at T8 causing mild to moderate thecal sac stenosis along with some dorsal osseous retropulsion causing mild to moderate central spinal canal stenosis at the T8 burst fracture.  The patient currently does not have any numbness or tingling or weakness involving his legs.  -We have paged on-call neurosurgery service, I am awaiting a return phone call to discuss the MRI findings    Neurosurgery did not return phone call.  Again patient has no neurologic deficits at this time.  -Discontinued IV Toradol order and placed order for no NSAIDs at this time    ADDENDUM:  Spoke with neurosurgery on-call.  No acute intervention necessary at this time given he is neurologically intact.  Their service will again see the patient later this morning in follow-up.  Agree with no NSAIDs.

## 2023-03-20 NOTE — PLAN OF CARE
PRIMARY DIAGNOSIS: fracture   OUTPATIENT/OBSERVATION GOALS TO BE MET BEFORE DISCHARGE:  1. Pain Status: Improved-controlled with oral pain medications.    2. Return to near baseline physical activity: No    3. Cleared for discharge by consultants (if involved): No    Discharge Planner Nurse   Safe discharge environment identified: Yes  Barriers to discharge: Yes       Entered by: Reema Conway RN 03/20/2023    Please review provider order for any additional goals.   Nurse to notify provider when observation goals have been met and patient is ready for discharge.    Pt A&O x4. IV SL. On RA, denies sob. Using IS in room independently, tolerating well. On regular diet, tolerating well. Laying flat in bed. Pt was able to void independently into urinal. Oral oxy effective for 10/10 pain per pt. Pt got measured for a TLSO brace, plan for brace placement tomorrow. Plan for thoracic XR once brace is in place. Currently resting in bed, able to make needs known.

## 2023-03-20 NOTE — ED NOTES
[facility]  ED Nurse Handoff Report    Itz Jurado is a 42 year old male   ED Chief complaint: Back Pain  . ED Diagnosis:   Final diagnoses:   None     Allergies:   Allergies   Allergen Reactions     Morphine Unknown     Penicillins        Code Status: Full Code  Activity level - Baseline/Home:  Independent. Activity Level - Current:   Assist X 2. Lift room needed: No. Bariatric: No   Needed: No   Isolation: No. Infection: Not Applicable.     Vital Signs:   Vitals:    03/19/23 1846 03/19/23 1847 03/19/23 1848 03/19/23 1850   BP:       Pulse:       Resp:       Temp:       TempSrc:       SpO2: 95% 95% 96% 94%   Weight:       Height:           Cardiac Rhythm:  ,      Pain level:    Patient confused: No. Patient Falls Risk: No.   Elimination Status: Unable to void   Patient Report - Initial Complaint: 42 year old male with a history of L4-L5 fusion who presents with his wife for evaluation of back pain. Boris reports that he was skiing today and fell landing on his back/butt. He denies hitting his head or losing consciousness. He states he has 9/10 pain in the middle of his back in the thoracic region. The pain is worse when he takes a breath and moves. He was not able to get up and has not walked since the fall. He denies any numbness, tingling, headache, neck pain, vision changes or vomiting. . Focused Assessment: pt comfortable laying in bed flat at this time.    Tests Performed: Labs Ordered and Resulted from Time of ED Arrival to Time of ED Departure - No data to display  . Abnormal Results:   Chest CT w IV contrast only, TRAUMA / DISSECTION   Final Result   IMPRESSION:    1.  Please separate report of the thoracic spine CT for details about the T8 vertebral body fracture and prevertebral soft tissue swelling.   2.  Mild linear and patchy opacities in the right lung apex and posterior right lung, may be due to atelectasis and possible pulmonary contusion.   3.  No other acute findings or  traumatic injury in the chest.      CT Thoracic Spine w/o Contrast   Final Result   IMPRESSION:   1.  Acute burst fracture of T8 with surrounding perivertebral soft tissue injury. Recommend spine surgery consultation.   2.  Mild osseous retropulsion without high-grade spinal canal stenosis. Mild-moderate T8-T9 foraminal foraminal narrowing.               .   Treatments provided: see mar, notes and flowsheets  Family Comments: n/a  OBS brochure/video discussed/provided to patient:  Yes  ED Medications:   Medications   cyclobenzaprine (FLEXERIL) tablet 10 mg (10 mg Oral $Given 3/19/23 1335)   HYDROmorphone (PF) (DILAUDID) injection 0.5 mg (0.5 mg Intravenous $Given 3/19/23 1336)   acetaminophen (TYLENOL) tablet 1,000 mg (1,000 mg Oral $Given 3/19/23 1335)   ketorolac (TORADOL) injection 15 mg (15 mg Intravenous $Given 3/19/23 1335)   sodium chloride for CT scan flush use (63 mLs Intravenous $Given 3/19/23 1431)   iopamidol (ISOVUE-370) solution 500 mL (100 mLs Intravenous $Given 3/19/23 1431)   oxyCODONE (ROXICODONE) tablet 5-10 mg (10 mg Oral $Given 3/19/23 1840)   cyclobenzaprine (FLEXERIL) tablet 10 mg (10 mg Oral $Given 3/19/23 1948)     Drips infusing:  No  For the majority of the shift, the patient's behavior Green. Interventions performed were n/a.    Sepsis treatment initiated: No     Patient tested for COVID 19 prior to admission: NO    ED Nurse Name/Phone Number: Pina Vickers RN,   7:50 PM    RECEIVING UNIT ED HANDOFF REVIEW    Above ED Nurse Handoff Report was reviewed: Yes  Reviewed by: Noman Bender RN on March 19, 2023 at 9:10 PM

## 2023-03-20 NOTE — PROVIDER NOTIFICATION
12:05 AM  Message: [facility] requesting a call back [phone] Dr Beltre

## 2023-03-20 NOTE — H&P
[facility]    History and Physical - Hospitalist Service       Date of Admission:  3/19/2023    Assessment & Plan Itz Jurado is a 42 year old male with PMHx of lumbar 4-5 fusion, who was admitted on 3/19/2023 after a fall down hill skiing. He presented with back pain, imaging confirms acute burst fracture of T8 vertebral body.    Thoracic Burst Fracture  Perivertebral Hematoma   Immediate back pain following fall on buttocks at moderate speed down hill skiing. Fortunately intact neurologic status.  He was wearing helmet, no head injury or pain elsewhere.   CT thoracic spine with Acute burst fracture of T8 vertebral body with mild retropulsion of posterior cortex, moderate anteropulsion, and breech through the endplates with approximately 60% height loss relative to the noncompressed adjacent vertebrae. Traumatic perivertebral swelling and hematoma spanning from T6 through T9. CT Chest Mild linear and patchy opacities in the right lung apex and posterior right lung, may be due to atelectasis and possible pulmonary contusion.  - In the ED neurosurgery was consulted who recommended establishment of TLSO bracing with upright x-rays.  Unfortunately I am told that they were unable to get a TLSO brace for the patient in the emergency department tonight so they wanted observation admission until upright xrays could be performed in TLSO brace. This would determine further management including surgical plans  - activity: bedrest, flat until cleared by NSG in brace  - orthotics consult for TLSO  - analgesia  - bladder scan PRN, monitor neurologic status   - monitor spo2, encourage IS for possible pulm contusion         Diet:  Regular     DVT Prophylaxis: Pneumatic Compression Devices  Rich Catheter: Not present    Cardiac Monitoring: None    Code Status:   Full code.     Clinically Significant Risk Factors Present on Admission                       # Overweight: Estimated body mass index is 29.82  "kg/m  as calculated from the following:    Height as of this encounter: 1.93 m (6' 4\").    Weight as of this encounter: 111.1 kg (245 lb).           Disposition Plan      Expected Discharge Date: 03/20/2023         to home.      The patient's care was discussed with the ED Team.    Carmela Diaz PA-C  Hospitalist Service  [facility]  Securely message with [employer] (more info)  Text page via [facility] Paging/Directory     ______________________________________________________________________    Chief Complaint   \"back pain\"    History is obtained from the patient    History of Present Illness   Itz Jurado is a 42 year old male who was brought to the emergency department for evaluation of back pain.  He was skiing this afternoon with his daughter that he was passing through an icy patch at a moderate speed when he fell on his buttocks and then ultimately on his back.  He developed immediate back pain and was concerned as he has history of a lumbar fusion.   were able to put him onto a gurney and bring him to EMS for ultimately hospital evaluation.  Since the injury he had not been able to get up due to pain.  He has pain in the middle of his back that is exacerbated with breathing and any movement.  Back pain level currently is similar to what he had after his spinal surgery rated an 8 or 9 out of 10.    He has no weakness in his legs or arms, numbness or tingling.  No headache, vision changes.  No incontinence.  He has history of lumbar fusion L4-L5 in 2017.  No history of osteoporosis or other fractures.  He denies any chronic medical problems, does not take daily medications. Denies history of issues with surgery.  Does not have any current establish primary care.  He was wearing a helmet when he fell and did not hit his head.  He denies any neck pain.      On presentation vital signs are stable.  No lab work obtained. CT Chest with contrast showed Mild linear and patchy " opacities in the right lung apex and posterior right lung, may be due to atelectasis and possible pulmonary contusion.  CT Thoracic spine imaging Acute burst fracture of T8 with surrounding perivertebral soft tissue injury, mild osseous retropulsion without high-grade spinal canal stenosis, T8-T9 foraminal narrowing.    In the ED neurosurgery was consulted who recommended establishment of TLSO bracing with upright x-rays.  Unfortunately I am told that they were unable to get a TLSO brace for the patient in the emergency department tonight so they wanted observation admission for this to be set up.    Admission was requested from hospitalist service for [employer] burst fracture    Past Medical History    As noted above.    Past Surgical History   L4-5 fusion, 2017    Prior to Admission Medications   None        Review of Systems    The 10 point Review of Systems is negative other than noted in the HPI or here.     Social History   Reviewed, lives with wife and family. No tobacco use history.     Physical Exam   Vital Signs: Temp: 97.6  F (36.4  C) Temp src: Oral BP: 136/80 Pulse: 81   Resp: 18 SpO2: 94 % O2 Device: None (Room air)    Weight: 245 lbs 0 oz    Constitutional: Awake, alert,  no apparent distress.  Eyes: Conjunctiva and pupils examined and normal.  HEENT: Moist mucous membranes, normal dentition.  Respiratory: Clear to auscultation bilaterally, no crackles or wheezing.  Cardiovascular: Regular rate and rhythm, normal S1 and S2, and no murmur noted.  GI: Soft, non-distended, non-tender, bowel sounds present. No rebound tenderness or guarding.  Lymph/Hematologic: No anterior cervical or supraclavicular adenopathy.  Skin: No rashes, no cyanosis, no edema.  Musculoskeletal: Pain with palpation spinous process thoracic region.  No visible paraspinal muscle spasms.  He is moving all extremities in bed with no gross deformities. Lower extremity plantarflexion and dorsiflexion is 5 out of 5 symmetric  bilaterally.  Neurologic: No focal deficits noted. Speech is clear. Coordination and strength grossly normal.   Psychiatric: Appropriate affect.       Medical Decision Making       75 MINUTES SPENT BY ME on the date of service doing chart review, history, exam, documentation & further activities per the note.      Data   ------------------------- PAST 24 HR DATA REVIEWED -----------------------------------------------

## 2023-03-20 NOTE — PLAN OF CARE
PRIMARY DIAGNOSIS: ACUTE BACK PAIN  OUTPATIENT/OBSERVATION GOALS TO BE MET BEFORE DISCHARGE:  1. Pain Status: Improved-controlled with I V Dilaudid medications.    2. Return to near baseline physical activity: No    3. Cleared for discharge by consultants (if involved): No    Vitals are Temp: 98  F (36.7  C) Temp src: Oral BP: 112/77 Pulse: 85   Resp: 18 SpO2: 94 %.    Patient is Alert and Oriented x4. Bed bound for the shift. Pt is a on Regular diet. Saline locked.  Bladder scan x 2, most recent 43 ml.  Plan:manage pain,consult for TLSO brace today  Discharge Planner Nurse   Safe discharge environment identified: No  Barriers to discharge: Yes       Entered by: Noman Bender RN 03/20/2023 5:51 AM     Please review provider order for any additional goals.   Nurse to notify provider when observation goals have been met and patient is ready for discharge.

## 2023-03-20 NOTE — PLAN OF CARE
PRIMARY DIAGNOSIS: fracture   OUTPATIENT/OBSERVATION GOALS TO BE MET BEFORE DISCHARGE:  1. Pain Status: Improved-controlled with oral pain medications.    2. Return to near baseline physical activity: No    3. Cleared for discharge by consultants (if involved): No    Discharge Planner Nurse   Safe discharge environment identified: Yes  Barriers to discharge: Yes       Entered by: Reema Conway RN 03/20/2023    Please review provider order for any additional goals.   Nurse to notify provider when observation goals have been met and patient is ready for discharge.    Pt A&O x4. IV SL. On RA, denies sob. On regular diet, tolerating well. Laying flat in bed. Pt bladder scanned, 481 in bladder, pt will attempt to use urinal. Pt got measured for a TLSO brace, plan for brace placement tomorrow. Currently resting in bed, able to make needs known.

## 2023-03-20 NOTE — CONSULTS
[facility]    Neurosurgery Consultation     Date of Admission:  3/19/2023  Date of Consult (When I saw the patient): 03/19/23    Assessment & Plan   Itz Jurado is a 42 year old male with history of L4-5 fusion (2017 in [place]) who was admitted on 3/19/2023 with thoracic back pain s/p fall while skiing. Radiographic findings include an acute burst fracture of T8 vertebral body with mild retropulsion and approximately 60% height loss. Patient reports thoracic back pain radiates to right sided ribs.     Plan:   - Thoracic spine MRI ordered due to radicular pain   - Orthotics consult for TLSO brace    - Upright thoracic spine xray while wearing brace   - Recommend to wear brace when out of bed. Avoid heavy lifting, bending, twisting.   - Continue pain control measures as needed   - Likely plan to follow up with NSG clinic in 6 weeks with xray prior   - Appreciate assistance from specialties    I have discussed the following assessment and plan with Dr. Adams.     Gisella Mcneal, CNP  [facility] [place] [address]  Tel [phone]  Pager [phone]    Code Status    No Order    Reason for Consult   Reason for consult: I was asked by Dr. Reeves to evaluate this patient for T8 fracture.    Primary Care Physician   Physician No Ref-Primary    Chief Complaint   Thoracic back pain s/p fall while skiing     History is obtained from the patient, electronic health record and emergency department physician    History of Present Illness   Itz Jurado is a 42 year old male with history of L4-5 fusion (2017 in [place]) who was admitted on 3/19/2023 with thoracic back pain s/p fall while skiing. Patient reports he was skiing today at [facility], lost control, fell, and landed on his buttocks and back. He developed thoracic back pain and was brought to the ED for further evaluation. Radiographic findings include an  "acute burst fracture of T8 vertebral body with mild retropulsion and approximately 60% height loss. Patient denies leg pain, numbness, or weakness. Patient reports thoracic back pain radiates to right sided ribs.     EXAM: CT THORACIC SPINE W/O CONTRAST  LOCATION: [facility]  DATE/TIME: 3/19/2023 2:46 PM  IMPRESSION:  1.  Acute burst fracture of T8 with surrounding perivertebral soft tissue injury. Recommend spine surgery consultation.  2.  Mild osseous retropulsion without high-grade spinal canal stenosis. Mild-moderate T8-T9 foraminal foraminal narrowing.       Past Medical History   I have reviewed this patient's medical history and updated it with pertinent information if needed.   No past medical history on file.    Past Surgical History   I have reviewed this patient's surgical history and updated it with pertinent information if needed.  No past surgical history on file.    Prior to Admission Medications   None     Allergies   Allergies   Allergen Reactions     Morphine Unknown     Penicillins        Social History   I have reviewed this patient's social history and updated it with pertinent information if needed. Itz Jurado      Family History   I have reviewed this patient's family history and updated it with pertinent information if needed.   No family history on file.    Review of Systems   10 point ROS negative other than symptoms noted in HPI.    Physical Exam   Temp: 97.6  F (36.4  C) Temp src: Oral BP: 136/80 Pulse: 81   Resp: 18 SpO2: 94 % O2 Device: None (Room air)    Vital Signs with Ranges  Temp:  [96.7  F (35.9  C)-97.6  F (36.4  C)] 97.6  F (36.4  C)  Pulse:  [64-81] 81  Resp:  [18] 18  BP: (132-140)/(80-94) 136/80  SpO2:  [94 %-98 %] 94 %  245 lbs 0 oz     , Blood pressure 136/80, pulse 81, temperature 97.6  F (36.4  C), temperature source Oral, resp. rate 18, height 1.93 m (6' 4\"), weight 111.1 kg (245 lb), SpO2 94 %.  245 lbs 0 oz    NEUROLOGICAL EXAMINATION: "   Mental status:  Alert and Oriented x 3, speech is fluent.  Motor:    Shoulder Abduction  Right:  5/5   Left:  5/5  Biceps                      Right:  5/5   Left:  5/5  Triceps                     Right:  5/5   Left:  5/5  Wrist Extensors        Right:  5/5   Left:  5/5  Wrist Flexors            Right:  5/5   Left:  5/5  Intrinsics                   Right:  5/5   Left:  5/5    Iliopsoas  (hip flexion)               Right: 5/5  Left:  5/5  Quadriceps  (knee extension)       Right:  5/5  Left:  5/5  Hamstrings  (knee flexion)            Right:  5/5  Left:  5/5  Gastroc Soleus  (PF)                          Right:  5/5  Left:  5/5  Tibialis Ant  (DF)                          Right:  5/5  Left:  5/5  EHL                          Right:  5/5  Left:  5/5    Sensation:  Intact to light touch   Reflexes:   Negative Clonus.  Negative Ma's sign.     No tenderness to palpation of thoracic spine.   Straight leg raise is negative bilaterally.

## 2023-03-20 NOTE — UTILIZATION REVIEW
"Admission Status; Secondary Review Determination     Admission Date: 3/19/2023 12:41 PM       Under the authority of the Utilization Management Committee, the utilization review process indicated a secondary review on the above patient.  The review outcome is based on review of the medical records, discussions with staff, and applying clinical experience noted on the date of the review.        (x)      Inpatient Status Appropriate - This patient's medical care is consistent with medical management for inpatient care and reasonable inpatient medical practice.       RATIONALE FOR DETERMINATION      Brief clinical presentation, information copied from the chart, abbreviated and edited for relevant content:     Paged team to advance to IP.     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 He presented with back pain, imaging confirms acute burst fracture of T8 vertebral body with perivertebral Hematoma. He experienced immediate back pain following fall on buttocks at moderate speed down hill skiing.  CT thoracic spine with Acute burst fracture of T8 vertebral body with mild retropulsion of posterior cortex, moderate anteropulsion, and breech through the endplates with approximately 60% height loss relative to the noncompressed adjacent vertebrae. Traumatic perivertebral swelling and hematoma spanning from T6 through T9. Initially admitted OBS, still needing pain control. NS consulting, non operative at this time. Awaiting Orthotics consult for TLSO brace. Plan to obtain imaging at that time to ensure stability and then have PT evaluate.    On BR for now. May elevate HOB to 20 degrees when eating,  Neuro checks per protocol.   Multimodal pain management - still receiving opioids today.          At the time of admission with the information available to the attending physician, more than 2 nights hospital complex care was " anticipated. Also, there was a risk of adverse outcome if patient was treated outpatient or observation. High intensity of services anticipated. Inpatient admission appropriate based on Medicare guidelines.       The information on this document is developed by the utilization review team in order for the business office to ensure compliance.  This only denotes the appropriateness of proper admission status and does not reflect the quality of care rendered.         The definitions of Inpatient Status and Observation Status used in making the determination above are those provided in the CMS Coverage Manual, Chapter 1 and Chapter 6, section 70.4.      Sincerely,      Rosmery Curry MD   Utilization Review/ Case Management  [facility].

## 2023-03-20 NOTE — PROGRESS NOTES
Neurosurgery progress note    Patient states he is having significant back pain that feels deep, in the mid thoracic region.  He denies any radicular symptoms.  Denies saddle anesthesia.  He has been unable to urinate since being admitted to the hospital , did have a catheterization last night for 700 mL.  Awaiting brace placement.    Exam    Alert and oriented no acute distress  Thoracic spine mildly tender to palpation in the region of T8  Moving bilateral lower extremities with 5 out of 5 strength  Reflexes 2+ bilateral patella  Negative ankle clonus    Assessment    Status post fall while skiing, acute burst fracture of T8 vertebral body, epidural hematoma    Plan    No surgical intervention is indicated  Obtain upright thoracic spine x-ray while wearing brace once it arrives  Proceed with physical therapy once brace has been fit, and patient can follow-up with neurosurgery as an outpatient in 6 weeks with x-rays prior.  Patient has no neurologic reason for not being able to urinate based on his imaging.  Anticipate this should improve with ambulation.    Discussed with Dr. Adams

## 2023-03-20 NOTE — PHARMACY-ADMISSION MEDICATION HISTORY
Pharmacy Medication History  Admission medication history interview status for Itz Jurado admitted on 3/19/2023 is complete. See [facility] admission navigator for allergy information, prior to admission medications and immunization status.     Medication history interview done, indicate source(s): Patient  Medication history resources (including written lists, pill bottles, clinic record): Surescripts    Significant changes made to PTA medication list:  ADDED: All     Medication reconciliation/reorder completed by provider prior to medication history? N/A    Prior to Admission medications    Medication Sig Last Dose Taking? Auth Provider Long Term End Date   ibuprofen (ADVIL/MOTRIN) 200 MG tablet Take 200 mg by mouth every 6 hours as needed for pain 3/18/2023 at PRN Yes Unknown, Entered By History         Alee Linton RP

## 2023-03-20 NOTE — ED NOTES
Pt unable to void on left side or right side while lying flat. Bladder scanned for 415cc. Hospitalist paged.

## 2023-03-20 NOTE — PLAN OF CARE
PRIMARY DIAGNOSIS: fracture   OUTPATIENT/OBSERVATION GOALS TO BE MET BEFORE DISCHARGE:  1. Pain Status: Improved-controlled with oral pain medications.    2. Return to near baseline physical activity: No    3. Cleared for discharge by consultants (if involved): No    Discharge Planner Nurse   Safe discharge environment identified: Yes  Barriers to discharge: Yes       Entered by: Reema Conway RN 03/20/2023    Please review provider order for any additional goals.   Nurse to notify provider when observation goals have been met and patient is ready for discharge.    Pt A&O x4. IV SL. On RA, denies sob. On regular diet, tolerating well. Laying flat in bed. Has not voided, urinal at bedside. Plan for TLSO brace. Currently resting in bed, able to make needs known.

## 2023-03-21 ENCOUNTER — DOCUMENTATION ONLY (OUTPATIENT)
Dept: ORTHOPEDICS | Facility: CLINIC | Age: 43
End: 2023-03-21
Payer: COMMERCIAL

## 2023-03-21 ENCOUNTER — APPOINTMENT (OUTPATIENT)
Dept: GENERAL RADIOLOGY | Facility: CLINIC | Age: 43
DRG: 965 | End: 2023-03-21
Attending: NURSE PRACTITIONER
Payer: COMMERCIAL

## 2023-03-21 PROCEDURE — L0486 TLSO RIGIDLINED CUST FAB TWO: HCPCS

## 2023-03-21 PROCEDURE — 250N000013 HC RX MED GY IP 250 OP 250 PS 637: Performed by: NURSE PRACTITIONER

## 2023-03-21 PROCEDURE — 250N000011 HC RX IP 250 OP 636: Performed by: PHYSICIAN ASSISTANT

## 2023-03-21 PROCEDURE — 99232 SBSQ HOSP IP/OBS MODERATE 35: CPT | Performed by: NURSE PRACTITIONER

## 2023-03-21 PROCEDURE — 120N000001 HC R&B MED SURG/OB

## 2023-03-21 PROCEDURE — 72070 X-RAY EXAM THORAC SPINE 2VWS: CPT

## 2023-03-21 PROCEDURE — 250N000013 HC RX MED GY IP 250 OP 250 PS 637: Performed by: PHYSICIAN ASSISTANT

## 2023-03-21 RX ORDER — NALOXONE HYDROCHLORIDE 0.4 MG/ML
0.2 INJECTION, SOLUTION INTRAMUSCULAR; INTRAVENOUS; SUBCUTANEOUS
Status: DISCONTINUED | OUTPATIENT
Start: 2023-03-21 | End: 2023-03-22 | Stop reason: HOSPADM

## 2023-03-21 RX ORDER — NALOXONE HYDROCHLORIDE 0.4 MG/ML
0.4 INJECTION, SOLUTION INTRAMUSCULAR; INTRAVENOUS; SUBCUTANEOUS
Status: DISCONTINUED | OUTPATIENT
Start: 2023-03-21 | End: 2023-03-22 | Stop reason: HOSPADM

## 2023-03-21 RX ORDER — TAMSULOSIN HYDROCHLORIDE 0.4 MG/1
0.4 CAPSULE ORAL DAILY
Status: DISCONTINUED | OUTPATIENT
Start: 2023-03-21 | End: 2023-03-22 | Stop reason: HOSPADM

## 2023-03-21 RX ADMIN — OXYCODONE HYDROCHLORIDE 10 MG: 5 TABLET ORAL at 05:55

## 2023-03-21 RX ADMIN — SENNOSIDES AND DOCUSATE SODIUM 2 TABLET: 50; 8.6 TABLET ORAL at 19:08

## 2023-03-21 RX ADMIN — TAMSULOSIN HYDROCHLORIDE 0.4 MG: 0.4 CAPSULE ORAL at 19:08

## 2023-03-21 RX ADMIN — HYDROMORPHONE HYDROCHLORIDE 0.3 MG: 1 INJECTION, SOLUTION INTRAMUSCULAR; INTRAVENOUS; SUBCUTANEOUS at 23:16

## 2023-03-21 RX ADMIN — OXYCODONE HYDROCHLORIDE 10 MG: 5 TABLET ORAL at 01:17

## 2023-03-21 RX ADMIN — OXYCODONE HYDROCHLORIDE 10 MG: 5 TABLET ORAL at 22:03

## 2023-03-21 RX ADMIN — ONDANSETRON 4 MG: 4 TABLET, ORALLY DISINTEGRATING ORAL at 15:28

## 2023-03-21 RX ADMIN — CYCLOBENZAPRINE 10 MG: 10 TABLET, FILM COATED ORAL at 08:08

## 2023-03-21 RX ADMIN — HYDROMORPHONE HYDROCHLORIDE 0.3 MG: 1 INJECTION, SOLUTION INTRAMUSCULAR; INTRAVENOUS; SUBCUTANEOUS at 15:27

## 2023-03-21 RX ADMIN — POLYETHYLENE GLYCOL 3350 17 G: 17 POWDER, FOR SOLUTION ORAL at 08:09

## 2023-03-21 RX ADMIN — OXYCODONE HYDROCHLORIDE 10 MG: 5 TABLET ORAL at 13:42

## 2023-03-21 RX ADMIN — OXYCODONE HYDROCHLORIDE 10 MG: 5 TABLET ORAL at 17:49

## 2023-03-21 RX ADMIN — SENNOSIDES AND DOCUSATE SODIUM 2 TABLET: 50; 8.6 TABLET ORAL at 08:08

## 2023-03-21 RX ADMIN — ACETAMINOPHEN 1000 MG: 500 TABLET ORAL at 08:08

## 2023-03-21 RX ADMIN — CYCLOBENZAPRINE 10 MG: 10 TABLET, FILM COATED ORAL at 19:08

## 2023-03-21 RX ADMIN — CYCLOBENZAPRINE 10 MG: 10 TABLET, FILM COATED ORAL at 13:43

## 2023-03-21 RX ADMIN — ACETAMINOPHEN 1000 MG: 500 TABLET ORAL at 15:26

## 2023-03-21 ASSESSMENT — ACTIVITIES OF DAILY LIVING (ADL)
ADLS_ACUITY_SCORE: 26

## 2023-03-21 NOTE — PLAN OF CARE
/80  Pulse 77   Temp 98.3  F  Resp 16  SpO2 94%     Patient is alert and oriented x4, has pain and oxycodone 10 mg was administered, was measured for a brace yesterday. Should get brace today and once has brace needs a standing x-ray done. Tolerating PO meds and fluids well. On bed rest until gets brace, voiding well with urinal at night, slept well, will continue to monitor.

## 2023-03-21 NOTE — PROGRESS NOTES
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 He presented with back pain, imaging confirms acute burst fracture of T8 vertebral body.     Interval events: Fitted with custom TLSO brace.  Attempted imaging to confirm stability of fracture but developed significant pain when he went to stand for imaging.  No new neurological deficits. Has been able to void.     Acute medical issues:  Thoracic Burst Fracture T8  Perivertebral Hematoma   Immediate back pain following fall on buttocks at moderate speed down hill skiing. Fortunately intact neurologic status.  He was wearing helmet, no head injury or pain elsewhere.   CT thoracic spine with Acute burst fracture of T8 vertebral body with mild retropulsion of posterior cortex, moderate anteropulsion, and breech through the endplates with approximately 60% height loss relative to the noncompressed adjacent vertebrae. Traumatic perivertebral swelling and hematoma spanning from T6 through T9.     -- Changed to inpatient on 3/20/2023.  -- NS consult. Appreciate their assistance. Planning for nonoperative management for now.  Follow up with NS in 6 weeks as OP with repeat plain imaging.  -- TLSO delivered today.  Attempt imaging this afternoon/evening yet and will have PT evaluate in the AM.    -- BR for now. May elevate HOB to 20 degrees when eating, etc.    -- Neuro checks per protocol.    -- Multimodal pain management.     Urinary retention  Required I/O cath a couple of times during admission for urinary retention. Has voided on own since I/O cath.  Does not correlate with fracture.  No urinary or bowel incontinence or priapism. Anticipate will be able to void more regularly when able to mobilize.   -- Monitor. Bladder scan PRN. I/O cath " "PRN.  Could consider placing indwelling catheter but would like to hold off.  -- Start tamsulosin to see if this helps with urination.  Encourage increased PO intake.      Pulmonary contusion  CT chest with mild linear and patch opacities in the right lung apex and posterior right lung concerning for atelectasis vs pulmonary contusion. Not hypoxic.  No respiratory issues at present.    -- Pulmonary toilet.    -- PRN O2.            Diet: Regular Diet Adult    DVT Prophylaxis: High risk for bleeding. PADUA score 3  Rich Catheter: Not present  Lines: None     Cardiac Monitoring: None  Code Status: Full Code      Clinically Significant Risk Factors                         # Overweight: Estimated body mass index is 29.58 kg/m  as calculated from the following:    Height as of this encounter: 1.93 m (6' 4\").    Weight as of this encounter: 110.2 kg (243 lb)., PRESENT ON ADMISSION  Complicates cares.        Disposition Plan  pending TLSO brace and mobility/pain control.          The patient's care was discussed with the Bedside Nurse, Care Coordinator/, Patient, Patient's Family and NS Consultant(s).    BABATUNDE [facility]  Hospitalist Service  [facility]  Securely message with [employer] (more info)  Text page via [employer] Paging/Directory   ______________________________________________________________________    Interval History   No CP or SOB.  No new neurological deficits.  Voiding on own but minimal. Correlates more with decreased mobility.  Trial Tamsulosin and increasing fluids.    Attempted imaging once TLSO delivered but had significant pain.  Will medicated with IV pain medications and reattempt.     Physical Exam   Vital Signs: Temp: 98.1  F (36.7  C) Temp src: Oral BP: (!) 128/94 Pulse: 87   Resp: 18 SpO2: 94 % O2 Device: None (Room air)    Weight: 243 lbs 0 oz    GEN:   Alert, oriented x 3, appears comfortable, NAD.  NECK:   Supple ,no mass or thyromegaly   HEENT:  " Normocephalic/atraumatic, no scleral icterus, no nasal discharge, mouth moist.  CV:   Regular rate and rhythm, no murmur or JVD.  S1 + S2 noted, no S3 or S4.  LUNGS:   Clear to auscultation bilaterally without rales/rhonchi/wheezing/retractions.  Symmetric chest rise on inhalation noted.  ABD:   Active bowel sounds, soft, non-tender/non-distended.  No rebound/guarding/rigidity.  EXT:   No edema.  No cyanosis.  No joint synovitis noted.  SKIN:   Dry to touch, no exanthems noted in the visualized areas.  Neurologic: Grossly intact,non focal. Spine: TTP T8.  ALVAREZ, 5/5 strength BLE.  Negative ankle clonus.   Neuropsychiatric:  General: normal, calm and normal eye contact  Level of consciousness: alert / normal  Affect: normal  Orientation: oriented to self, place, time and situation     Medical Decision Making       45 MINUTES SPENT BY ME on the date of service doing chart review, history, exam, documentation & further activities per the note.      Data         Imaging results reviewed over the past 24 hrs:   No results found for this or any previous visit (from the past 24 hour(s)).

## 2023-03-21 NOTE — PLAN OF CARE
"Care from [id number]    Inpatient Progress Note:  For complete assessment see flow sheet documentation.    BP (!) 130/93 (BP Location: Right arm)   Pulse 88   Temp 98.1  F (36.7  C) (Oral)   Resp 16   Ht 1.93 m (6' 4\")   Wt 110.2 kg (243 lb)   SpO2 94%   BMI 29.58 kg/m            Orientation: AxOx4  Pain status: 6/10 in back  Activity: Bedrest until placement of brace is confirmed  Resp: WDL  Cardiac: WDL  GI: WDL   : some hesitancy when urinating  Skin: WDL  LDA: PIV SL      :    Pt fitted with custom TLSO brace.  Pt wearing brace when not sleeping.  Received PRN medication for pain in back.  States that back pain is worse when using left sided extremities.  Urinating in urinal.    Reports some continued hesitancy with urinating.  Tolerating diet well.        Will Continue to provide cares.    Inessa Graff RNGoal Outcome Evaluation:                        "

## 2023-03-21 NOTE — PROGRESS NOTES
S: Boris Jurado was seen at [facility], Room 233-01, for the fit and delivery of a Custom [place] TLSO.  The patient s Nurse was present and assisted with log rolling the patient.    Dx: T8 Bust Fracture    O:  The TLSO was fit to the patient.  The brace was a good fit.  The patient was impressed the brace was light weight and comfortable to wear.    A: I explained the donning and doffing protocol to the patient.  I m including all the donning/doffing instructions at the end of this note and there is a printed copy in the patient s TLSO clear bag.    P:  The TLSO should be worn according to the Physician s instructions.  Please contact the Orthotics & Prosthetics Office at [phone] if you have any questions.  Thank you, Rei VALLEJO: The TLSO increases medial-lateral and anterior-posterior stability of the spine.  The TLSO also applies compression to the patient s soft tissues.  Compression of the patient s soft tissues serves to unload the injured vertebrae which reduces pain and promotes healing.  The goal of this orthosis is to provide spinal stability, promote healing and reduce pain while the patient performs their ADLs.    Electronically signed by Rei Alexander CPO, ANTOINE    How to Put on a TLSO Back Brace  For optimal fit brace should NOT be donned with patient sitting. Ideal fit is achieved by donning in either laying or standing position. Due to changes in belly tissue, it is difficult to achieve a proper fit when patient is sitting.  1. Apply a snug-fitting cotton t-shirt.  Loose shirts may cause wrinkles and skin irritation.  2. Patient should be supine. Palpate for waist (below ribs but above hips) so you know where to line up waist grooves of the brace.  3. Logroll patient and slide back section of brace under patient lining up waist groove of brace with patient's waist.  4. Roll patient onto their back with the posterior section behind them. Recheck alignment of waist groove on brace with patient's  waist. It may be necessary to logroll patient to the opposite side to get posterior section centered on patient, where it extends anteriorly equal amounts on patient's sides. Repeat logrolling side to side as necessary.  5. Once posterior section is positioned correctly, lay anterior section on patient. Again, line up waist groove of brace to patient's waist. Waist grooves of anterior and posterior section should match up and fit together. Anterior shell should go over the top of posterior shell. Velcro straps should line up with the chafes/D-rings, if they don't, either the anterior or the posterior sections are not in the proper place.  6. Fasten the middle straps first and tighten both sides evenly. Then fasten either top or bottom straps in the same manner. Brace should be snug so as to prevent brace from sliding up on patient. If it is too loose, the brace will slide up and cause discomfort to the patient in the chin and/or axilla area.  7. When properly fit, brace the inferior aspect should allow clearance so as not to cut into the tops of the thighs when sitting but needs to be as low on the pelvic area as possible.    Wearing Schedule  Always check with prescribing doctor for a precise wearing schedule. At times the TLSO is worn only when out of bed while sitting or standing. Other times, the doctor requires the TLSO to be worn at all times.    Cleaning and Maintenance  Rubbing alcohol may be used to clean the inside and outside of the TLSO. Spray TLSO with alcohol and wipe gently with a cloth. Be sure that TLSO is completely dry prior to application to ensure no skin issues will occur. Always wear a clean, dry, snug-fitting shirt under the brace.    Tips and Problem Solving  Brace migration is inevitable, especially when patient is going from laying to upright in bed. The mattress may push posterior section of brace up, which will push the whole brace up. Migration may also occur when patient sits in a soft  cushioned chair. Don't be afraid to readjust brace and pull it down and back to its proper position.    Avoid soft seat cushioned chairs and sit up straight. Soft seat cushions or leaning back into a chair will cause the brace to migrate upward and may place pressure underarms.      Instead of leaning back, try sitting on the front half of the seat of the chair and work your way back until you contact the backrest of the chair.  Sometimes using a pillow in the gap between the back of the chair and the brace will provide more support.    Do not lean forward over a table while eating. Bring the food up to the mouth. This will reduce any pressure on the thighs and chest.    If the TLSO starts to migrate upward under the throat/armpits, ensure that straps are snug. Straps that are loose will allow the brace to shift.

## 2023-03-21 NOTE — PLAN OF CARE
"Pt taken to radiology via WC.  Began experiencing severe pain, was sweating profusely, was pale, and felt like he was going to \"pass out\".  With assist of multiple staff Pt transferred to [facility] and returned to floor.  Pt received PRN zofran and dilaudid 0.3 mg.  Pt returned to bed and had some lunch.  Pt had taken 10 mg of Oxy on empty stomach.  Provider evaluated Pt in the hallway on the way to radiology.                          "

## 2023-03-21 NOTE — PROGRESS NOTES
[facility]  Neurosurgery Daily Progress Note    Assessment & Plan   Itz Jurado is a 42 year old male with history of L4-5 fusion (2017 in [place]) who was admitted on 3/19/2023 with thoracic back pain s/p fall while skiing. Radiographic findings include an acute burst fracture of T8 vertebral body with mild retropulsion and approximately 60% height loss. Today, patient reports 7/10 thoracic back pain with radiating pain/paresthesias along right sided ribs. Neuro exam stable. Orthotics met with patient yesterday and measured for custom TLSO.     Plan:  - Awaiting TLSO brace   - Upright thoracic spine xray while wearing brace   - Recommend to wear brace when out of bed. Avoid heavy lifting, bending, twisting.   - Continue pain control measures as needed   - Follow up with NSG clinic in 6 weeks with xray prior   - Appreciate assistance from specialties    Gisella Mcneal CNP  [facility]  [address] [place] [id number]  Tel [phone]  Pager [phone]    Interval History   Stable     Physical Exam   Temp: 98  F (36.7  C) Temp src: Oral BP: 139/85 Pulse: 81   Resp: 16 SpO2: 93 % O2 Device: None (Room air)    Vitals:    03/19/23 1305 03/19/23 2257   Weight: 111.1 kg (245 lb) 110.2 kg (243 lb)       Mental status:  Alert and oriented x 3, speech is fluent.  Motor:    Shoulder Abduction  Right:  5/5   Left:  5/5  Biceps                      Right:  5/5   Left:  5/5  Triceps                     Right:  5/5   Left:  5/5  Wrist Extensors        Right:  5/5   Left:  5/5  Wrist Flexors            Right:  5/5   Left:  5/5  Intrinsics                   Right:  5/5   Left:  5/5    Iliopsoas  (hip flexion)               Right: 5/5  Left:  5/5  Quadriceps  (knee extension)       Right:  5/5  Left:  5/5  Hamstrings  (knee flexion)            Right:  5/5  Left:  5/5  Gastroc Soleus  (PF)                          Right:  5/5  Left:   5/5  Tibialis Ant  (DF)                          Right:  5/5  Left:  5/5  EHL                          Right:  5/5  Left:  5/5    Sensation:  Intact to light touch    Medications        acetaminophen  1,000 mg Oral Q8H     cyclobenzaprine  10 mg Oral TID     polyethylene glycol  17 g Oral Daily     senna-docusate  1 tablet Oral BID    Or     senna-docusate  2 tablet Oral BID

## 2023-03-22 ENCOUNTER — APPOINTMENT (OUTPATIENT)
Dept: PHYSICAL THERAPY | Facility: CLINIC | Age: 43
DRG: 965 | End: 2023-03-22
Attending: NURSE PRACTITIONER
Payer: COMMERCIAL

## 2023-03-22 VITALS
HEIGHT: 76 IN | BODY MASS INDEX: 29.59 KG/M2 | SYSTOLIC BLOOD PRESSURE: 136 MMHG | TEMPERATURE: 97.8 F | WEIGHT: 243 LBS | HEART RATE: 86 BPM | DIASTOLIC BLOOD PRESSURE: 92 MMHG | RESPIRATION RATE: 16 BRPM | OXYGEN SATURATION: 93 %

## 2023-03-22 PROCEDURE — 97116 GAIT TRAINING THERAPY: CPT | Mod: GP | Performed by: PHYSICAL THERAPIST

## 2023-03-22 PROCEDURE — 250N000013 HC RX MED GY IP 250 OP 250 PS 637: Performed by: NURSE PRACTITIONER

## 2023-03-22 PROCEDURE — 99231 SBSQ HOSP IP/OBS SF/LOW 25: CPT | Performed by: PHYSICIAN ASSISTANT

## 2023-03-22 PROCEDURE — 99239 HOSP IP/OBS DSCHRG MGMT >30: CPT | Performed by: PHYSICIAN ASSISTANT

## 2023-03-22 PROCEDURE — 97161 PT EVAL LOW COMPLEX 20 MIN: CPT | Mod: GP | Performed by: PHYSICAL THERAPIST

## 2023-03-22 PROCEDURE — 250N000013 HC RX MED GY IP 250 OP 250 PS 637: Performed by: PHYSICIAN ASSISTANT

## 2023-03-22 PROCEDURE — 97530 THERAPEUTIC ACTIVITIES: CPT | Mod: GP | Performed by: PHYSICAL THERAPIST

## 2023-03-22 PROCEDURE — 250N000011 HC RX IP 250 OP 636: Performed by: PHYSICIAN ASSISTANT

## 2023-03-22 RX ORDER — POLYETHYLENE GLYCOL 3350 17 G/17G
17 POWDER, FOR SOLUTION ORAL DAILY PRN
Qty: 510 G | Refills: 0 | Status: SHIPPED | OUTPATIENT
Start: 2023-03-22

## 2023-03-22 RX ORDER — CYCLOBENZAPRINE HCL 10 MG
10 TABLET ORAL 3 TIMES DAILY
Qty: 60 TABLET | Refills: 0 | Status: SHIPPED | OUTPATIENT
Start: 2023-03-22 | End: 2023-04-11

## 2023-03-22 RX ORDER — ACETAMINOPHEN 500 MG
1000 TABLET ORAL EVERY 8 HOURS
Qty: 600 TABLET | Refills: 0 | Status: SHIPPED | OUTPATIENT
Start: 2023-03-22

## 2023-03-22 RX ORDER — OXYCODONE HYDROCHLORIDE 10 MG/1
5-10 TABLET ORAL EVERY 6 HOURS PRN
Qty: 15 TABLET | Refills: 0 | Status: SHIPPED | OUTPATIENT
Start: 2023-03-22 | End: 2023-03-27

## 2023-03-22 RX ORDER — TAMSULOSIN HYDROCHLORIDE 0.4 MG/1
0.4 CAPSULE ORAL DAILY
Qty: 10 CAPSULE | Refills: 0 | Status: SHIPPED | OUTPATIENT
Start: 2023-03-23 | End: 2023-04-11

## 2023-03-22 RX ORDER — LIDOCAINE 50 MG/G
1-2 PATCH TOPICAL EVERY 24 HOURS
Qty: 60 PATCH | Refills: 0 | Status: SHIPPED | OUTPATIENT
Start: 2023-03-22 | End: 2023-04-11

## 2023-03-22 RX ORDER — AMOXICILLIN 250 MG
1 CAPSULE ORAL 2 TIMES DAILY
Qty: 60 TABLET | Refills: 0 | Status: SHIPPED | OUTPATIENT
Start: 2023-03-22

## 2023-03-22 RX ADMIN — OXYCODONE HYDROCHLORIDE 10 MG: 5 TABLET ORAL at 03:52

## 2023-03-22 RX ADMIN — OXYCODONE HYDROCHLORIDE 10 MG: 5 TABLET ORAL at 08:26

## 2023-03-22 RX ADMIN — CYCLOBENZAPRINE 10 MG: 10 TABLET, FILM COATED ORAL at 08:08

## 2023-03-22 RX ADMIN — ACETAMINOPHEN 1000 MG: 500 TABLET ORAL at 00:23

## 2023-03-22 RX ADMIN — ACETAMINOPHEN 1000 MG: 500 TABLET ORAL at 08:07

## 2023-03-22 RX ADMIN — TAMSULOSIN HYDROCHLORIDE 0.4 MG: 0.4 CAPSULE ORAL at 08:08

## 2023-03-22 RX ADMIN — CYCLOBENZAPRINE 10 MG: 10 TABLET, FILM COATED ORAL at 13:20

## 2023-03-22 RX ADMIN — SENNOSIDES AND DOCUSATE SODIUM 2 TABLET: 50; 8.6 TABLET ORAL at 08:08

## 2023-03-22 RX ADMIN — HYDROMORPHONE HYDROCHLORIDE 0.3 MG: 1 INJECTION, SOLUTION INTRAMUSCULAR; INTRAVENOUS; SUBCUTANEOUS at 08:31

## 2023-03-22 RX ADMIN — OXYCODONE HYDROCHLORIDE 10 MG: 5 TABLET ORAL at 13:20

## 2023-03-22 ASSESSMENT — ACTIVITIES OF DAILY LIVING (ADL)
ADLS_ACUITY_SCORE: 26

## 2023-03-22 NOTE — PLAN OF CARE
/80  Pulse 88   Temp 98.3  F  Resp 18  SpO2 91%      Patient is alert and oriented x4, oxycodone was administered for pain, tolerating PO meds and fluids well, continent of bladder, states brace needs to be adjusted because it rubs against that part of the spine that is fracture and is very uncomfortable. Want orthotics to take a look at it. Assist of 1 with gait belt, walker and brace to bathroom. Did not get out of bed on our shift. Slept well through out the shift, expected discharge today but patient still needs to practice steps, will have PT today.

## 2023-03-22 NOTE — PLAN OF CARE
Physical Therapy Discharge Summary    Reason for therapy discharge:    Discharged to home. With assist of wife as needed.    Progress towards therapy goal(s). See goals on Care Plan in [facility] electronic health record for goal details.  Goals met    Therapy recommendation(s):    No further therapy is recommended.Continued mobility in the home environment    Goal Outcome Evaluation:

## 2023-03-22 NOTE — PLAN OF CARE
"Shift from [id number]     Inpatient Progress Note:  For complete assessment see flow sheet documentation.      Orientation: A/O x4, makes needs known  Neuro: WDL  Pain status: Pt c/o pain in the lower back, rating it 8-9/10. IV dilaudid and oxycodone 10mg given, was effective. Also gave scheduled pain meds, see MAR.  Activity: SBA with ambulation  Peripheral edema: None  Resp: WDL  Cardiac: WDL  GI: WDL  : WDL  Skin: WDL  LDA: PIV removed by NST as pt discharged  Infusions: None  Pertinent Labs: None  Diet: Regular, tolerating without s/s N/V  Consults: Neurosurgery, PT  Discharge Plan: Pt will discharge home with self care; wife to pickup pt. Education and discharge paperwork done. VSS.    Vital signs:  Temp: 97.8  F (36.6  C) Temp src: Oral BP: (!) 136/92 Pulse: 86   Resp: 16 SpO2: 93 % O2 Device: None (Room air)   Height: 193 cm (6' 4\") Weight: 110.2 kg (243 lb)  Estimated body mass index is 29.58 kg/m  as calculated from the following:    Height as of this encounter: 1.93 m (6' 4\").    Weight as of this encounter: 110.2 kg (243 lb).     Patient's After Visit Summary was reviewed with patient and/or spouse.   Patient verbalized understanding of After Visit Summary, recommended follow up and was given an opportunity to ask questions.   Discharge medications sent home with patient/family: YES   Discharged with spouse    Lisbet Lin RN       "

## 2023-03-22 NOTE — PLAN OF CARE
"INPATIENT NOTE: 1500 - 2300     PRIMARY PROBLEM: Back Pain     Vital Signs: Stable      Orientation: A/O x 4    Neuro: Intact    Pain status: Controlled by pain meds    Resp: Lung sounds, CTA, denies SOB    Cardiac: WDL    GI: Bowel sounds, active Last BM; 3/19    : Continent of Bladder x 2    Skin: WDL    LDA: Peripheral IV to ANAYELI arm    Pertinent Labs/Imaging: CT to Thoracic spine shows an   acute burst fracture of T8    Diet: Regular    Activity: Assist 1/walker. Pt requested waist brace to be removed d/t feelings of pressuring the broken back    Alarms/Safety: All alarms on and audible     Consults: Neurosurgery, Orthosis, & PT    Discharge Date: TBD      BP (!) 141/94 (BP Location: Right arm)   Pulse 90   Temp 98.6  F (37  C) (Oral)   Resp 18   Ht 1.93 m (6' 4\")   Wt 110.2 kg (243 lb)   SpO2 91%   BMI 29.58 kg/m        Will continue to monitor and provide cares.     Dawna Izaguirre RN        "

## 2023-03-22 NOTE — PROGRESS NOTES
"   03/22/23 0800   Appointment Info   Signing Clinician's Name / Credentials (PT) Moni Balderas PT   Rehab Comments (PT) Wife present   Living Environment   People in Home child(shena), dependent;spouse   Current Living Arrangements house   Home Accessibility stairs to enter home;stairs within home   Number of Stairs, Main Entrance 3   Stair Railings, Main Entrance none   Number of Stairs, Within Home, Primary greater than 10 stairs   Stair Railings, Within Home, Primary railings on both sides of stairs  (wide staircase, can't reach B rails at same time)   Transportation Anticipated car, drives self;family or friend will provide   Living Environment Comments Pt lives with wife and 2 childrem (ages 7 and 10) in a 3 level home   Self-Care   Usual Activity Tolerance good   Current Activity Tolerance moderate   Equipment Currently Used at Home none   Fall history within last six months no   Activity/Exercise/Self-Care Comment Pt works as the director of research and development, [employer] - can work from home and has an office in [place].  Pt was independent with all ADLs and gait without  an AD.   General Information   Onset of Illness/Injury or Date of Surgery 03/19/23   Referring Physician Pradeep Pagan   Patient/Family Therapy Goals Statement (PT) Pt agreeable to PT goals   Pertinent History of Current Problem (include personal factors and/or comorbidities that impact the POC) 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 He presented with back pain, imaging confirms acute burst fracture of T8 vertebral body.   Existing Precautions/Restrictions brace worn when out of bed   General Observations Pt lying in bed with TLSO on.   Cognition   Affect/Mental Status (Cognition) WNL   Follows Commands (Cognition) follows one-step commands;over 90% accuracy   Pain Assessment   Patient Currently in Pain Yes, see Vital Sign " flowsheet  (3/10 LBP at rest, 6-7/10 with mobility, Upper back pain with R shld flex)   Integumentary/Edema   Integumentary/Edema no deficits were identifed   Posture    Posture Forward head position;Protracted shoulders   Range of Motion (ROM)   ROM Comment Limited R shld flex to ~ 70 deg due to pain.  Spinal mobility limited due to brace and precautions   Strength (Manual Muscle Testing)   Strength Comments Decreased R UE and core strength   Bed Mobility   Bed Mobility supine-sit   Supine-Sit [place] (Bed Mobility) minimum assist (75% patient effort);verbal cues   Comment, (Bed Mobility) sup > sit wtih HOB elevated ~20 deg, like adjustable bed at home.   Transfers   Transfers sit-stand transfer   Sit-Stand Transfer   Sit-Stand [place] (Transfers) supervision   Comment, (Sit-Stand Transfer) sit <> stand   Gait/Stairs (Locomotion)   [place] Level (Gait) supervision   Distance in Feet 50' (eval) + 150' (treatment)   Comment, (Gait/Stairs) Pt amb 50' without AD with slow gait but no imbalance   Balance   Balance Comments Pt demonstrates good standing balance with gait.  Pt unable to perform high level balance challenges due to back pain and spinal precautions.   Sensory Examination   Sensory Perception patient reports no sensory changes   Coordination   Coordination no deficits were identified   Muscle Tone   Muscle Tone no deficits were identified   Clinical Impression   Criteria for Skilled Therapeutic Intervention Yes, treatment indicated   PT Diagnosis (PT) Impaired mobility   Influenced by the following impairments back pain, decreased activity tolerance, decreased R UE and core strength   Functional limitations due to impairments Unable to amb longer community distances, difficulty reaching for ADLs, supervised mobility on stairs   Clinical Presentation (PT Evaluation Complexity) Stable/Uncomplicated   Clinical Presentation Rationale Pt medically stable with stong family support   Clinical  Decision Making (Complexity) low complexity   Planned Therapy Interventions (PT) bed mobility training;cryotherapy;gait training;patient/family education;orthotic fitting/training;stair training;transfer training;progressive activity/exercise;risk factor education;home program guidelines   Anticipated Equipment Needs at Discharge (PT) other (see comments)  (toilet tongs)   Risk & Benefits of therapy have been explained evaluation/treatment results reviewed;care plan/treatment goals reviewed;risks/benefits reviewed;current/potential barriers reviewed;participants voiced agreement with care plan;participants included;patient;spouse/significant other   PT Total Evaluation Time   PT Eval, Low Complexity Minutes (40899) 10   Physical Therapy Goals   PT Frequency One time eval and treatment only   PT Predicted Duration/Target Date for Goal Attainment 03/22/23   PT Goals Bed Mobility;Transfers;Gait;Stairs   PT: Bed Mobility Independent;Supine to/from sit;Rolling   PT: Transfers Independent;Sit to/from stand   PT: Gait Supervision/stand-by assist;150 feet   PT: Stairs Supervision/stand-by assist;Greater than 10 stairs;Rail on both sides  (Pt can only reach one rail at a time)   PT Discharge Planning   PT Plan DC to home with wife   PT Discharge Recommendation (DC Rec) home with assist   PT Rationale for DC Rec Pt demonstrating all mobility with S/SBA.  Pt able to amb 200' without AD and up/down 1 flight of stairs with SBA.  Recommend pt return home with assist of wife for ADLs and IADLs as needed.  Recommended pt use a shower chair and toilet tongs to maintain spinal precautions.   PT Brief overview of current status Ind amb in room and S in halls without AD   Total Session Time   Total Session Time (sum of timed and untimed services) 10

## 2023-03-22 NOTE — DISCHARGE SUMMARY
[facility] Discharge Summary          Itz Jurado MRN# [id number]   Age: 42 year old YOB: 1980     Date of Admission:  3/19/2023  Date of Discharge::  3/22/2023  Admitting Physician:  BABATUNDE Mike CNP  Discharge Physician:  Emely Goddard PA-C  Primary Physician: No Ref-Primary, Physician     Primary Discharge Diagnoses:   Thoracic Burst Fracture T8  Perivertebral Hematoma   Urinary Retension  Pulmonary Contusion     Hospital Course:   For detail history, please refer to H & P from 3/19/2023. In brief, this is a 42 year old male 42 year old male with PMHx of lumbar 4-5 fusion, who was admitted on 3/19/2023 after a fall down hill skiing. He presented with back pain, imaging confirms acute burst fracture of T8 vertebral body.        Thoracic Burst Fracture T8  Perivertebral Hematoma - pt presented with Immediate back pain following fall on buttocks at moderate speed down hill skiing. Fortunately intact neurologic status. He was wearing helmet, no head injury or pain elsewhere.   - CT thoracic spine with Acute burst fracture of T8 vertebral body with mild retropulsion of posterior cortex, moderate anteropulsion, and breech through the endplates with approximately 60% height loss relative to the noncompressed adjacent vertebrae. Traumatic perivertebral swelling and hematoma spanning from T6 through T9.  - Neurosurgery was consulted, non operative management  - Orthotics was consulted and patient was placed in a TLSO brace  - Wear brace when out of bed, no need to wear it while lying down.  - multi modal analgesia prescribed, patient instructed to avoid NSAIDs  - follow-up with neurosurgery as an outpatient in 6 weeks with x-rays prior  - follow up with PCP within one week for ongoing management of acute pain.  - outpatient PT referral    Urinary retention - Required I/O cath a couple of times during admission for urinary retention.  Started on Flomax with improvement.        Pulmonary contusion - CT chest with mild linear and patch opacities in the right lung apex and posterior right lung concerning for atelectasis vs pulmonary contusion. No hypoxia this hospital stay.        Procedures/Imaging:     Results for orders placed or performed during the hospital encounter of 03/19/23   CT Thoracic Spine w/o Contrast    Narrative    EXAM: CT THORACIC SPINE W/O CONTRAST  LOCATION: [facility]  DATE/TIME: 3/19/2023 2:46 PM    INDICATION: Trauma fall.  COMPARISON: None.  TECHNIQUE: Routine CT Thoracic Spine without IV contrast. Multiplanar reformats. Dose reduction techniques were used.     FINDINGS:  VERTEBRA: Mild upper thoracic dextrocurvature and straightening of kyphosis is most likely related to positioning and/or muscle spasm. Acute burst fracture of T8 vertebral body with mild retropulsion of posterior cortex, moderate anteropulsion, and   breech through the endplates with approximately 60% height loss relative to the noncompressed adjacent vertebrae. Other vertebral body heights are intact. Facets are intact.    CANAL/FORAMINA: Disc spaces are maintained. Probable indentation of the ventral thecal sac at T8. Mild left and moderate right foraminal stenosis at T8-T9. No high-grade spinal canal stenosis.    PARASPINAL: Traumatic perivertebral swelling and hematoma spanning from T6 through T9. Please refer to the separately dictated CT report regarding details of the chest .      Impression    IMPRESSION:  1.  Acute burst fracture of T8 with surrounding perivertebral soft tissue injury. Recommend spine surgery consultation.  2.  Mild osseous retropulsion without high-grade spinal canal stenosis. Mild-moderate T8-T9 foraminal foraminal narrowing.        Chest CT w IV contrast only, TRAUMA / DISSECTION    Narrative    EXAM: CT CHEST W CONTRAST  LOCATION: [facility]  DATE/TIME: 3/19/2023 2:47 PM    INDICATION: chest rib pain after fall skiing,  tenderness posterolateral right inferior ribs   eval fx ptx, etc  COMPARISON: None.  TECHNIQUE: CT chest with IV contrast. Multiplanar reformats were obtained. Dose reduction techniques were used.    CONTRAST: 100mL Isovue 370    FINDINGS:   LUNGS AND PLEURA: Linear and patchy opacities in the posterior right lung and right lung apex, likely atelectasis and possible pulmonary contusion. No pneumothorax, hemothorax or pleural effusion. Mild left basilar atelectasis. No suspicious pulmonary   nodules or masses.    MEDIASTINUM/AXILLAE: No lymphadenopathy. No mediastinal hematoma. No central pulmonary emboli. No thoracic aortic aneurysm or traumatic aortic injury. Prevertebral soft tissue thickening at the T8 vertebral level, related to the T8 vertebral body   fracture.    CORONARY ARTERY CALCIFICATION: Mild.    UPPER ABDOMEN: No significant finding.    MUSCULOSKELETAL: Please see report of the thoracic spine CT for details about the T8 vertebral body fracture. No other fractures visualized..      Impression    IMPRESSION:   1.  Please separate report of the thoracic spine CT for details about the T8 vertebral body fracture and prevertebral soft tissue swelling.  2.  Mild linear and patchy opacities in the right lung apex and posterior right lung, may be due to atelectasis and possible pulmonary contusion.  3.  No other acute findings or traumatic injury in the chest.   MR Thoracic Spine w/o Contrast    Narrative    EXAM: MR THORACIC SPINE W/O CONTRAST  LOCATION: [facility]  DATE/TIME: 3/19/2023 11:35 PM    INDICATION: T8 fracture, radiating pain to right ribs.  COMPARISON: CT thoracic spine from same day.  TECHNIQUE: Routine Thoracic Spine MRI without IV contrast.    FINDINGS:   Again seen is an acute burst type compression fracture deformity with marrow edema obliquely tracking across the T8 vertebral body. There is stable appearing ventral and dorsal osseous retropulsion. Dorsal osseous  retropulsion extends approximately 4 mm   into the spinal canal where the dorsal osseous fragment contacts the ventral aspect of the thoracic spinal cord and causes mild to moderate central spinal canal stenosis. No definite associated spinal cord edema is present at this level. Below the level   of T8 there is subtle increased signal within the central spinal cord, suggesting prominence of the central canal. There is a thin amount of ventral epidural hematoma at the level of T8 measuring up to 2 mm in AP dimensions and approximately 3.1 cm in   craniocaudal dimensions. The epidural hematoma extends down to the level of the inferior T9 vertebral body ventrally. Epidural hematoma appears asymmetrically prominent to the left at this level there does also noted dorsally, extending down to level of   T10 and superiorly up to the level of T6. There is mild associated thecal sac stenosis.    Along the posterior vertebral body of T8, there is thinning of the posterior longitudinal ligament without reena disruption identified. The anterior longitudinal ligament at T8 is not well appreciated.    Marrow edema is noted tracking into the left pedicle at T8 and base of the right pedicle at T8. Additionally, there is a small amount of posttraumatic marrow edema identified involving the inferolateral right posterior corner of T7 and anterior/inferior   corner of T7.    Bilateral anterolateral paraspinal edema/hematoma is present centered at T8.    Edema is noted involving the T8-T9 interspinous space, suggesting interspinous ligamentous injury.    A small amount of fluid signal is identified involving the bilateral T8-T9 facet joints, possibly reflecting capsular injury. Small amount of edema is also noted involving bilateral T7-T8 facet joints.    The remaining vertebral body heights and marrow signal pattern are maintained and normal for patient's age.     Mild multilevel disc desiccation and disc height loss. No disc  herniations identified. No high-grade foraminal stenosis identified on the left. On the right at T8-T9 there is moderate neural foraminal stenosis. No high-grade spinal canal stenosis on a   degenerative basis.     Bilateral pleural fluid collections. Nonspecific patchy opacity involving the right lower lobe.    Subcentimeter cystic lesion within the dorsal right hepatic lobe likely reflects a biliary cyst versus hamartoma in the absence of known malignancy.      Impression    IMPRESSION:  1.  Stable burst type compression fracture deformity at T8 with persistent dorsal osseous retropulsion causing mild to moderate central spinal canal stenosis.  2.  Epidural hematoma centered at T8 causing mild thecal sac stenosis.  3.  Mild posttraumatic edema along the inferior endplate of T7.  4.  Interspinous ligamentous injury at T8-T9.  5.  No definite abnormal spinal cord signal.  6.  Additional findings above.        Dr. Shiv Beltre discussed results with Dr. Covarrubias on 03/20/2023 at 1:08 AM.   XR Thoracic Spine 2 Views    Narrative    EXAM: XR THORACIC SPINE 2 VIEWS  LOCATION: [facility]  DATE/TIME: 3/21/2023 7:00 PM    INDICATION: T8 burst fracture. TLSO brace fitting. Evaluate stability of fracture.  COMPARISON: CT dated 3/19/2023      Impression    IMPRESSION: Again seen is a burst fracture at the level of T8 with approximately 50% height loss noted anteriorly. The margins of the T8 burst fracture are obscured anteriorly due to overlying soft tissues and osseous structures. Stable-appearing   levoconvex curvature of the thoracic spine centered at T8 on the AP view. On the lateral view, there is approximately 30 degrees of focal kyphosis of the lumbar spine centered at T8. No significant posttraumatic listhesis is identified at the level of   T8. The remaining osseous structures and vertebral body heights are maintained.    There is nonspecific gaseous distention of the large bowel. Consider  "abdominal radiographs for further assessment, as clinically indicated. There are low lung volumes with bronchovascular crowding.     Allergies:     Allergies   Allergen Reactions     Morphine Unknown     Penicillins         Subjective:   Patient reports a localized pain in his mid back.  Denies any numbness or tingling.  Feels ready to discharge home.    Physical Exam:   Blood pressure (!) 137/96, pulse 91, temperature 98  F (36.7  C), temperature source Oral, resp. rate 16, height 1.93 m (6' 4\"), weight 110.2 kg (243 lb), SpO2 91 %.  General: Alert, interactive, NAD  HEENT: AT/NC  Resp: clear to auscultation bilaterally, no crackles or wheezes  Cardiac: regular rate and rhythm  Abdomen: Soft  Extremities: No LE edema, wearing TLSO brace  Skin: Warm and dry  Neuro: Alert & oriented, moves all extremities equally     Discharge Medicatios:        Discharge Medication List as of 3/22/2023 12:28 PM      START taking these medications    Details   acetaminophen (TYLENOL) 500 MG tablet Take 2 tablets (1,000 mg) by mouth every 8 hours, Disp-600 tablet, R-0, E-Prescribe      cyclobenzaprine (FLEXERIL) 10 MG tablet Take 1 tablet (10 mg) by mouth 3 times daily, Disp-60 tablet, R-0, E-Prescribe      lidocaine (LIDODERM) 5 % patch Place 1-2 patches onto the skin every 24 hours To prevent lidocaine toxicity, patient should be patch free for 12 hrs daily.Disp-60 patch, [id number]      menthol (ICY HOT) 5 % PTCH Apply 1 patch topically every 8 hours as needed for muscle soreness Do not use at the same time as lidocaine patch, Disp-60 patch, R-0, E-Prescribe      oxyCODONE (ROXICODONE) 10 MG tablet Take 0.5-1 tablets (5-10 mg) by mouth every 6 hours as needed for severe pain (7-10), Disp-15 tablet, R-0, Local Print      polyethylene glycol (MIRALAX) 17 GM/Dose powder Take 17 g by mouth daily as needed for constipation, Disp-510 g, R-0, E-Prescribe      senna-docusate (SENOKOT-S/PERICOLACE) 8.6-50 MG tablet Take 1 tablet by " mouth 2 times daily, Disp-60 tablet, R-0, E-Prescribe      tamsulosin (FLOMAX) 0.4 MG capsule Take 1 capsule (0.4 mg) by mouth daily, Disp-10 capsule, R-0, E-Prescribe         STOP taking these medications       ibuprofen (ADVIL/MOTRIN) 200 MG tablet Comments:   Reason for Stopping:               Instructions Given to Patient as Discharge:     Discharge Procedure Orders   XR Thoracic Spine 2 Views   Standing Status: Future Standing Exp. Date: 03/20/24     Order Specific Question Answer Comments   Priority Routine    Is this exam to be performed at [facility]? No      Physical Therapy Referral   Standing Status: Future   Referral Priority: Routine Referral Type: Rehab Therapy Physical Therapy   Number of Visits Requested: 1     Neurosurgery Referral   Standing Status: Future   Referral Priority: Routine: Next available opening Referral Type: Consultation   Requested Specialty: Neurological Surgery   Number of Visits Requested: 1     Reason for your hospital stay   Order Comments: You were hospitalized due to a fall while skiing which resulted in an acute burst fracture of T8 vertebral body and epidural hematoma.  Neurosurgery was consulted and no surgery is indicated.     Activity   Order Comments: Your activity upon discharge:     Wear brace when out of bed, no need to wear it while lying down.     Order Specific Question Answer Comments   Is discharge order? Yes      Follow-up and recommended labs and tests    Order Comments: Follow up with PCP early next week.  Ongoing pain management and narcotic prescriptions will be through your PCP.  Taper off narcotics as your pain allows.  Long term use of narcotics is not recommended.  Do not use any ibuprofen/aleve/aspirin type products or blood thinners due to your injury.  Follow up with Neurosurgery in clinic in 6 weeks with x-rays prior.  Tamsulosin was prescribed for urinary retention which is due to immobility and narcotic use.     Diet    Order Comments: Follow this diet upon discharge: Orders Placed This Encounter      Regular Diet Adult     Order Specific Question Answer Comments   Is discharge order? Yes        Pending Tests at Discharge:   none    Discharge Disposition:     Discharged to home     Emely Goddard MS, PA-C  Hospitalist Service  Pager [phone]    >30 minutes was spent in discharge planning, care coordination, physical examination and medication reconciliation on the date of discharge, 3/22/2023

## 2023-03-22 NOTE — PROGRESS NOTES
Neurosurgery progress note    Brace was fitted yesterday, patient has been up with physical therapy and is deemed to be ready for discharge.  He reports improvement in his back pain when he is wearing the brace.  He denies any radicular symptoms.  He continues to have a patch of numbness in his anterior abdomen which is unchanged since the initial incident a few days ago.  He has been able to urinate now that he has been ambulating.    Exam    Alert and oriented no acute distress  Moving bilateral lower extremities with 5 out of 5 strength  Wearing hard shell TLSO brace    Imaging    IMPRESSION: Again seen is a burst fracture at the level of T8 with approximately 50% height loss noted anteriorly. The margins of the T8 burst fracture are obscured anteriorly due to overlying soft tissues and osseous structures. Stable-appearing   levoconvex curvature of the thoracic spine centered at T8 on the AP view. On the lateral view, there is approximately 30 degrees of focal kyphosis of the lumbar spine centered at T8. No significant posttraumatic listhesis is identified at the level of   T8. The remaining osseous structures and vertebral body heights are maintained.     There is nonspecific gaseous distention of the large bowel. Consider abdominal radiographs for further assessment, as clinically indicated. There are low lung volumes with bronchovascular crowding.    Assessment    Status post fall while skiing, acute burst fracture of T8 vertebral body, epidural hematoma    Plan    No surgical intervention is indicated   follow-up with neurosurgery as an outpatient in 6 weeks with x-rays prior.  Wear brace when out of bed, no need to wear it while lying down.

## 2023-03-24 ENCOUNTER — MYC MEDICAL ADVICE (OUTPATIENT)
Dept: FAMILY MEDICINE | Facility: CLINIC | Age: 43
End: 2023-03-24
Payer: COMMERCIAL

## 2023-03-26 NOTE — PROGRESS NOTES
Date of Admission:                  3/19/2023  Date of Discharge::                 3/22/2023  Admitting Physician:               BABATUNDE Mike CNP  Discharge Physician:              Emely Goddard PA-C  Primary Physician: No Ref-Primary, Physician     Primary Discharge Diagnoses:   Thoracic Burst Fracture T8  Perivertebral Hematoma   Urinary Retension  Pulmonary Contusion     Hospital Course:   For detail history, please refer to H & P from 3/19/2023. In brief, this is a 42 year old male 42 year old male with PMHx of lumbar 4-5 fusion, who was admitted on 3/19/2023 after a fall down hill skiing. He presented with back pain, imaging confirms acute burst fracture of T8 vertebral body.         Thoracic Burst Fracture T8  Perivertebral Hematoma - pt presented with Immediate back pain following fall on buttocks at moderate speed down hill skiing. Fortunately intact neurologic status. He was wearing helmet, no head injury or pain elsewhere.   - CT thoracic spine with Acute burst fracture of T8 vertebral body with mild retropulsion of posterior cortex, moderate anteropulsion, and breech through the endplates with approximately 60% height loss relative to the noncompressed adjacent vertebrae. Traumatic perivertebral swelling and hematoma spanning from T6 through T9.  - Neurosurgery was consulted, non operative management       Follow up  - Orthotics was consulted and patient was placed in a TLSO brace  - Wear brace when out of bed, no need to wear it while lying down.  - multi modal analgesia prescribed, patient instructed to avoid NSAIDs  - follow-up with neurosurgery as an outpatient in 6 weeks with x-rays prior  - follow up with PCP within one week for ongoing management of acute pain.  - outpatient PT referral     Urinary retention - Required I/O cath a couple of times during admission for urinary retention.  Started on Flomax with improvement.       Pulmonary contusion - CT chest with mild linear and  patch opacities in the right lung apex and posterior right lung concerning for atelectasis vs pulmonary contusion. No hypoxia this hospital stay.

## 2023-03-27 ENCOUNTER — MYC MEDICAL ADVICE (OUTPATIENT)
Dept: FAMILY MEDICINE | Facility: CLINIC | Age: 43
End: 2023-03-27

## 2023-03-27 ENCOUNTER — OFFICE VISIT (OUTPATIENT)
Dept: FAMILY MEDICINE | Facility: CLINIC | Age: 43
End: 2023-03-27
Payer: COMMERCIAL

## 2023-03-27 ENCOUNTER — TELEPHONE (OUTPATIENT)
Dept: FAMILY MEDICINE | Facility: CLINIC | Age: 43
End: 2023-03-27

## 2023-03-27 VITALS
HEART RATE: 82 BPM | SYSTOLIC BLOOD PRESSURE: 130 MMHG | BODY MASS INDEX: 29.68 KG/M2 | DIASTOLIC BLOOD PRESSURE: 84 MMHG | WEIGHT: 243.8 LBS | OXYGEN SATURATION: 96 %

## 2023-03-27 DIAGNOSIS — S22.061A CLOSED STABLE BURST FRACTURE OF EIGHTH THORACIC VERTEBRA, INITIAL ENCOUNTER (H): ICD-10-CM

## 2023-03-27 DIAGNOSIS — S27.329D CONTUSION OF LUNG, UNSPECIFIED LATERALITY, SUBSEQUENT ENCOUNTER: ICD-10-CM

## 2023-03-27 DIAGNOSIS — R33.9 URINARY RETENTION: ICD-10-CM

## 2023-03-27 DIAGNOSIS — Z09 HOSPITAL DISCHARGE FOLLOW-UP: Primary | ICD-10-CM

## 2023-03-27 PROCEDURE — 99203 OFFICE O/P NEW LOW 30 MIN: CPT | Performed by: FAMILY MEDICINE

## 2023-03-27 RX ORDER — OXYCODONE HYDROCHLORIDE 10 MG/1
5-10 TABLET ORAL EVERY 6 HOURS PRN
Qty: 15 TABLET | Refills: 0 | Status: SHIPPED | OUTPATIENT
Start: 2023-03-27 | End: 2023-03-27

## 2023-03-27 RX ORDER — OXYCODONE HYDROCHLORIDE 10 MG/1
5-10 TABLET ORAL EVERY 6 HOURS PRN
Qty: 15 TABLET | Refills: 0 | Status: CANCELLED | OUTPATIENT
Start: 2023-03-27

## 2023-03-27 RX ORDER — CYCLOBENZAPRINE HCL 10 MG
10 TABLET ORAL 3 TIMES DAILY
Qty: 60 TABLET | Refills: 0 | Status: CANCELLED | OUTPATIENT
Start: 2023-03-27

## 2023-03-27 RX ORDER — OXYCODONE HYDROCHLORIDE 10 MG/1
5-10 TABLET ORAL EVERY 6 HOURS PRN
Qty: 40 TABLET | Refills: 0 | Status: SHIPPED | OUTPATIENT
Start: 2023-03-27 | End: 2023-04-11

## 2023-03-27 NOTE — TELEPHONE ENCOUNTER
Please advise  Pt was in for office visit with provider this morning  Dispositions   0 Return in about 1 week (around 4/3/2023) for Follow up for symptoms recheck.     Provider does not have any openings until mid April  Is it ok to schedule pt in same day appts?  Also pt would like to do virtual visit.  Pt states he has to drive 25 minutes to clinic.  Need to call pt to advise.  Billie Valdez MA on 3/27/2023 at 9:08 AM

## 2023-03-27 NOTE — PROGRESS NOTES
"  Assessment & Plan     Hospital discharge follow-up   Skiing accident with burst thoracic vertebra fracture; on TLSO brace; doing well except for pain when laying down, hard to find a comfortable position. Was given a few pills of OXycodne and helping but ran out. Will do some more; also using Advil/Tylenol in between    Closed stable burst fracture of eighth thoracic vertebra, initial encounter (H)    Says does not need Narcan  - oxyCODONE IR (ROXICODONE) 10 MG tablet; Take 0.5-1 tablets (5-10 mg) by mouth every 6 hours as needed for severe pain (7-10)    Contusion of lung, unspecified laterality, subsequent encounter    -  Doing well no SOB    Urinary retention    -  resolved     MED REC REQUIRED  Post Medication Reconciliation Status: discharge medications reconciled, continue medications without change      BMI:   Estimated body mass index is 29.68 kg/m  as calculated from the following:    Height as of 3/19/23: 1.93 m (6' 4\").    Weight as of this encounter: 110.6 kg (243 lb 12.8 oz).   Weight management plan: Discussed healthy diet and exercise guidelines    Manuel Jordan MD  [facility] ZEB Escalante is a 42 year old, presenting for the following health issues:  Hospital F/U and Pain    Additional Questions 3/27/2023   Roomed by paulette   Accompanied by wife, Farida     Patient Reported Additional Medications 3/27/2023   Patient reports taking the following new medications oxycodone, acetamenophine,senokot,cyclobenzaprine     HPI     Pain usually 3/10 when resting  Taking Tylenol 1000 mg  Oxycodone 10 mg every 6 hours;     Also has Brace; use when not in bed     Hospital Follow-up Visit:    Hospital/Nursing Home/IP Rehab Facility: [facility]  Date of Admission: 3/19/23  Date of Discharge: 3/22/23    Reason(s) for Admission: thoracic burst fracture    Was your hospitalization related to COVID-19? No   Problems taking medications regularly:  " None  Medication changes since discharge: yes- all meds are new to pt  acetaminophen (TYLENOL) 500 MG tablet Take 2 tablets (1,000 mg) by mouth every 8 hours, Disp-600 tablet, R-0, E-Prescribe       cyclobenzaprine (FLEXERIL) 10 MG tablet Take 1 tablet (10 mg) by mouth 3 times daily, Disp-60 tablet, R-0, E-Prescribe       lidocaine (LIDODERM) 5 % patch Place 1-2 patches onto the skin every 24 hours To prevent lidocaine toxicity, patient should be patch free for 12 hrs daily.Disp-60 patch, [id number]       menthol (ICY HOT) 5 % PTCH Apply 1 patch topically every 8 hours as needed for muscle soreness Do not use at the same time as lidocaine patch, Disp-60 patch, R-0, E-Prescribe       oxyCODONE (ROXICODONE) 10 MG tablet Take 0.5-1 tablets (5-10 mg) by mouth every 6 hours as needed for severe pain (7-10), Disp-15 tablet, R-0, Local Print       polyethylene glycol (MIRALAX) 17 GM/Dose powder Take 17 g by mouth daily as needed for constipation, Disp-510 g, R-0, E-Prescribe       senna-docusate (SENOKOT-S/PERICOLACE) 8.6-50 MG tablet Take 1 tablet by mouth 2 times daily, Disp-60 tablet, R-0, E-Prescribe       tamsulosin (FLOMAX) 0.4 MG capsule           Problems adhering to non-medication therapy:  None    Summary of hospitalization:  [facility] discharge summary reviewed  this is a 42 year old male 42 year old male with PMHx of lumbar 4-5 fusion, who was admitted on 3/19/2023 after a fall down [employer] skiing. He presented with back pain, imaging confirms acute burst fracture of T8 vertebral body.         Thoracic Burst Fracture T8  Perivertebral Hematoma - pt presented with Immediate back pain following fall on buttocks at moderate speed down hill skiing. Fortunately intact neurologic status. He was wearing helmet, no head injury or pain elsewhere.   - CT thoracic spine with Acute burst fracture of T8 vertebral body with mild retropulsion of posterior cortex, moderate anteropulsion, and breech through  the endplates with approximately 60% height loss relative to the noncompressed adjacent vertebrae. Traumatic perivertebral swelling and hematoma spanning from T6 through T9.  - Neurosurgery was consulted, non operative management       Follow up  - Orthotics was consulted and patient was placed in a TLSO brace  - Wear brace when out of bed, no need to wear it while lying down.  - multi modal analgesia prescribed, patient instructed to avoid NSAIDs  - follow-up with neurosurgery as an outpatient in 6 weeks with x-rays prior  - follow up with PCP within one week for ongoing management of acute pain.  - outpatient PT referral     Urinary retention - Required I/O cath a couple of times during admission for urinary retention.  Started on Flomax with improvement.       Pulmonary contusion - CT chest with mild linear and patch opacities in the right lung apex and posterior right lung concerning for atelectasis vs pulmonary contusion. No hypoxia this hospital stay.            Diagnostic Tests/Treatments reviewed.  Follow up needed: Neurosurgery and PT  Other Healthcare Providers Involved in Patient s Care:         Specialist appointment - after 6 weeks and Physical Therapy  Update since discharge: improved.    Plan of care communicated with patient and family       Review of Systems   Constitutional, HEENT, cardiovascular, pulmonary, gi and gu systems are negative, except as otherwise noted.      Objective    /84 (BP Location: Right arm, Patient Position: Sitting, Cuff Size: Adult Regular)   Pulse 82   Wt 110.6 kg (243 lb 12.8 oz)   SpO2 96%   BMI 29.68 kg/m    Body mass index is 29.68 kg/m .  Physical Exam   GENERAL: healthy, alert and no distress  CHEST: in LSO brace  RESP: lungs clear to auscultation - no rales, rhonchi or wheezes  CV: regular rate and rhythm, no murmur, click or rub, no peripheral edema   MS: no gross musculoskeletal defects noted, no edema

## 2023-03-27 NOTE — TELEPHONE ENCOUNTER
Please advise mychart message  Current Oxycodone Rx    oxyCODONE IR (ROXICODONE) 10 MG tablet 15 tablet 0 3/27/2023  No   Sig - Route: Take 0.5-1 tablets (5-10 mg) by mouth every 6 hours as needed for severe pain (7-10) - Oral     Billie Valdez MA on 3/27/2023 at 10:00 AM

## 2023-04-04 ENCOUNTER — THERAPY VISIT (OUTPATIENT)
Dept: PHYSICAL THERAPY | Facility: CLINIC | Age: 43
End: 2023-04-04
Attending: PHYSICIAN ASSISTANT
Payer: COMMERCIAL

## 2023-04-04 DIAGNOSIS — S22.069A: ICD-10-CM

## 2023-04-04 DIAGNOSIS — M54.6 ACUTE MIDLINE THORACIC BACK PAIN: ICD-10-CM

## 2023-04-04 DIAGNOSIS — S22.061A CLOSED STABLE BURST FRACTURE OF EIGHTH THORACIC VERTEBRA, INITIAL ENCOUNTER (H): ICD-10-CM

## 2023-04-04 PROCEDURE — 97161 PT EVAL LOW COMPLEX 20 MIN: CPT | Mod: GP | Performed by: PHYSICAL THERAPIST

## 2023-04-04 PROCEDURE — 97110 THERAPEUTIC EXERCISES: CPT | Mod: GP | Performed by: PHYSICAL THERAPIST

## 2023-04-04 NOTE — PROGRESS NOTES
Physical Therapy Initial Evaluation  Subjective:  The history is provided by the patient. No  was used.   Patient Health History  Itzpetros Jurado being seen for Evaluation for PT post spine fracture. Need to decrease pain, gain stability and strength..     Problem began: 3/19/2023.   Problem occurred: Fell on my back while skiing.   Pain is reported as 4/10 on pain scale.  General health as reported by patient is good.  Pertinent medical history includes: other. Other medical history details: 2 laminectomies in 2013 on L4-L5, followed by L4-L5 fusion in Feb 2017..   Red flags:  None as reported by patient.  Medical allergies: none.   Surgeries include:  Orthopedic surgery. Other surgery history details: Fusion L4-L5.    Current medications:  Anti-inflammatory, muscle relaxants and pain medication.    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   Primary job tasks include:  Computer work.                  Therapist Generated HPI Evaluation  Problem details: Pt was skiing on 3/19/23 and fell onto his buttocks and had immediate pain in his mid back. MRI showed burst fracture at T8. Previous fusion of L4-5 in 2017. Wearing TLSO for three months (non-surgical at this time). Referred to PT, to be using the TLSO when up and moving around, OK to take off when lying down and showering. Pt reports some pain, reducing pain medications. Raising arms is challenging, poor endurance with arms in front of him. .         Type of problem:  Thoracic spine.    This is a new condition.  Condition occurred with:  A fall/slip.  Where condition occurred: during recreation/sport.  Patient reports pain:  Mid thoracic.  Pain is described as aching and is intermittent.  Pain radiates to:  None. Pain is the same all the time.  Since onset symptoms are gradually improving.  Associated symptoms:  Loss of motion/stiffness. Symptoms are exacerbated by certain positions (arms out in front, reaching)  and  relieved by analgesics and muscle relaxants.  Special tests included:  CT scan.    Restrictions due to condition include:  Working in normal job without restrictions.  Barriers include:  Requires assistance with ADL's.                        Objective:  Standing Alignment:    Cervical/Thoracic:  Forward head                                    Cervical/Thoracic Evaluation  Thoracic AROM: not assessed        Cervical Myotomes:  not assessed                        Cervical Palpation:  not assessed                   Shoulder Evaluation:  ROM:  AROM:  normal (PDM on L abduction, ERP B abduction)                                  Strength:  not assessed                                                               Ovi Cervical Evaluation      Movement Loss:    Flexion (Flex): nil  Retraction (RET): min and pain  Extension (EXT): min and pain  Lateral Flexion Right (LF R): nil  Lateral Flexion Left (LF L): nil  Rotation Right (ROT R): min and pain  Rotation Left (ROT L): nil                                                 ROS    Assessment/Plan:    Patient is a 42 year old male with thoracic complaints.    Patient has the following significant findings with corresponding treatment plan.                Diagnosis 1:  T8 burst fracture  Pain -  manual therapy, self management, education and home program  Decreased ROM/flexibility - manual therapy, therapeutic exercise, therapeutic activity and home program  Decreased joint mobility - manual therapy, therapeutic exercise, therapeutic activity and home program  Decreased strength - therapeutic exercise, therapeutic activities and home program  Inflammation - self management/home program  Decreased function - therapeutic activities and home program      Low complexity using standardized patient assessment instrument and/or measureable assessment of functional outcome.  Cumulative Therapy Evaluation is: Low complexity.    Previous and current functional limitations:  (See  Goal Flow Sheet for this information)    Short term and Long term goals: (See Goal Flow Sheet for this information)     Communication ability:  Patient appears to be able to clearly communicate and understand verbal and written communication and follow directions correctly.  Treatment Explanation - The following has been discussed with the patient:   RX ordered/plan of care  Anticipated outcomes  Possible risks and side effects  This patient would benefit from PT intervention to resume normal activities.   Rehab potential is excellent.    Frequency:  1 X week, once daily  Duration:  for 12 weeks  Discharge Plan:  Achieve all LTG.  Independent in home treatment program.  Reach maximal therapeutic benefit.    Please refer to the daily flowsheet for treatment today, total treatment time and time spent performing 1:1 timed codes.

## 2023-04-11 ENCOUNTER — OFFICE VISIT (OUTPATIENT)
Dept: FAMILY MEDICINE | Facility: CLINIC | Age: 43
End: 2023-04-11
Payer: COMMERCIAL

## 2023-04-11 ENCOUNTER — THERAPY VISIT (OUTPATIENT)
Dept: PHYSICAL THERAPY | Facility: CLINIC | Age: 43
End: 2023-04-11
Attending: PHYSICIAN ASSISTANT
Payer: COMMERCIAL

## 2023-04-11 VITALS
TEMPERATURE: 97.2 F | HEART RATE: 101 BPM | HEIGHT: 76 IN | WEIGHT: 240.1 LBS | DIASTOLIC BLOOD PRESSURE: 98 MMHG | BODY MASS INDEX: 29.24 KG/M2 | OXYGEN SATURATION: 97 % | SYSTOLIC BLOOD PRESSURE: 145 MMHG

## 2023-04-11 DIAGNOSIS — Z82.49 FAMILY HISTORY OF EARLY CAD: ICD-10-CM

## 2023-04-11 DIAGNOSIS — S22.069A: Primary | ICD-10-CM

## 2023-04-11 DIAGNOSIS — M54.6 ACUTE MIDLINE THORACIC BACK PAIN: ICD-10-CM

## 2023-04-11 DIAGNOSIS — S22.061A CLOSED STABLE BURST FRACTURE OF EIGHTH THORACIC VERTEBRA, INITIAL ENCOUNTER (H): Primary | ICD-10-CM

## 2023-04-11 DIAGNOSIS — Z83.719 FAMILY HISTORY OF COLONIC POLYPS: ICD-10-CM

## 2023-04-11 DIAGNOSIS — D48.5 NEOPLASM OF UNCERTAIN BEHAVIOR OF SKIN: ICD-10-CM

## 2023-04-11 DIAGNOSIS — Z13.1 DIABETES MELLITUS SCREENING: ICD-10-CM

## 2023-04-11 DIAGNOSIS — Z30.09 ENCOUNTER FOR VASECTOMY COUNSELING: ICD-10-CM

## 2023-04-11 DIAGNOSIS — R03.0 ELEVATED BLOOD PRESSURE READING WITHOUT DIAGNOSIS OF HYPERTENSION: ICD-10-CM

## 2023-04-11 DIAGNOSIS — M51.369 DDD (DEGENERATIVE DISC DISEASE), LUMBAR: ICD-10-CM

## 2023-04-11 DIAGNOSIS — Z13.220 SCREENING CHOLESTEROL LEVEL: ICD-10-CM

## 2023-04-11 PROCEDURE — 99214 OFFICE O/P EST MOD 30 MIN: CPT | Performed by: FAMILY MEDICINE

## 2023-04-11 PROCEDURE — 97110 THERAPEUTIC EXERCISES: CPT | Mod: GP | Performed by: PHYSICAL THERAPIST

## 2023-04-11 PROCEDURE — 97530 THERAPEUTIC ACTIVITIES: CPT | Mod: GP | Performed by: PHYSICAL THERAPIST

## 2023-04-11 PROCEDURE — 97112 NEUROMUSCULAR REEDUCATION: CPT | Mod: GP | Performed by: PHYSICAL THERAPIST

## 2023-04-11 RX ORDER — CYCLOBENZAPRINE HCL 10 MG
10 TABLET ORAL 3 TIMES DAILY
Qty: 60 TABLET | Refills: 0 | Status: SHIPPED | OUTPATIENT
Start: 2023-04-11

## 2023-04-11 RX ORDER — OXYCODONE HYDROCHLORIDE 10 MG/1
5-10 TABLET ORAL EVERY 6 HOURS PRN
Qty: 28 TABLET | Refills: 0 | Status: SHIPPED | OUTPATIENT
Start: 2023-04-11

## 2023-04-11 RX ORDER — DIAZEPAM 10 MG
TABLET ORAL
Qty: 1 TABLET | Refills: 0 | Status: SHIPPED | OUTPATIENT
Start: 2023-04-11

## 2023-04-11 NOTE — PROGRESS NOTES
"Subjective:  HPI  Physical Exam                    Objective:  System    Physical Exam    General     ROS    Assessment/Plan:    SUBJECTIVE  Subjective changes as noted by pt:  Week went OK. Some good days and some bad days. Yesterday was a bad day, not sure why. The HEP is going \"OK.\" The wand abduction can create some pain if going too high.        Current pain level: 2/10     Changes in function:  Yes (See Goal flowsheet attached for changes in current functional level)     Adverse reaction to treatment or activity:  None    OBJECTIVE  Changes in objective findings:  Yes, B shoulder abduction with wand 100 deg.         ASSESSMENT  Itz continues to require intervention to meet STG and LTG's: PT  Patient is progressing as expected.  Response to therapy has shown lack of progress in  pain level  Progress made towards STG/LTG?  Yes (See Goal flowsheet attached for updates on achievement of STG and LTG)    PLAN  Continue current treatment plan until patient demonstrates readiness to progress to higher level exercises.    PTA/ATC plan:  N/A    Please refer to the daily flowsheet for treatment today, total treatment time and time spent performing 1:1 timed codes.        "

## 2023-04-11 NOTE — PROGRESS NOTES
Assessment & Plan     Closed stable burst fracture of eighth thoracic vertebra, initial encounter (H)  My first visit with patient and previous history reviewed with him and in his chart.  Suffered a T8 burst fracture from a skiing accident in March.  Conservative management with TLSO brace and gentle physical therapy.  Neurosurgery follow-up early May.  Situation specific and not something that would qualify for chronic pain management.  - cyclobenzaprine (FLEXERIL) 10 MG tablet; Take 1 tablet (10 mg) by mouth 3 times daily  - oxyCODONE IR (ROXICODONE) 10 MG tablet; Take 0.5-1 tablets (5-10 mg) by mouth every 6 hours as needed for moderate to severe pain or severe pain    Family history of colonic polyps  Had a colonoscopy 5 years ago which was clean but is supposed to have another one sometime this year  - Colonoscopy Screening  Referral; Future    Family history of early CAD  We will keep an eye on risk factors  - TSH with free T4 reflex; Future    Neoplasm of uncertain behavior of skin  Hyperpigmented lesion somewhat irregularly shaped but sharp borders lower lip.  We will get him in with dermatology  - Adult Dermatology Referral; Future    DDD (degenerative disc disease), lumbar  Chart updated with previous history    Screening cholesterol level    - Lipid panel reflex to direct LDL Fasting; Future    Diabetes mellitus screening    - Glucose; Future    Encounter for vasectomy counseling  See separate note  - diazepam (VALIUM) 10 MG tablet; Take 45-60 minutes prior to procedure.    Elevated blood pressure  Seemingly situation specific      Post Medication Reconciliation Status:   See Patient Instructions    Beena Samuel MD  [facility]    Ajit Escalante is a 42 year old, presenting for the following health issues:  Hospital F/U and Establish Care        4/11/2023     2:22 PM   Additional Questions   Roomed by Alia   Accompanied by Self         4/11/2023     2:22 PM  "  Patient Reported Additional Medications   Patient reports taking the following new medications None     HPI       Hospital Follow-up Visit:    Hospital/Nursing Home/IP Rehab Facility: [facility]  Date of Admission: 03/19/2023  Date of Discharge: 03/22/2023  Reason(s) for Admission: Epidural hematoma    Was your hospitalization related to COVID-19? No   Problems taking medications regularly:  None  Medication changes since discharge: None  Problems adhering to non-medication therapy:  None        Here today to establish care.  Follow-up on back issue as above.      Review of Systems   Constitutional, HEENT, cardiovascular, pulmonary, gi and gu systems are negative, except as otherwise noted.      Objective    BP (!) 145/98 (BP Location: Right arm, Patient Position: Sitting, Cuff Size: Adult Regular)   Pulse 101   Temp 97.2  F (36.2  C) (Tympanic)   Ht 1.93 m (6' 4\")   Wt 108.9 kg (240 lb 1.6 oz)   SpO2 97%   BMI 29.23 kg/m    Body mass index is 29.23 kg/m .  Physical Exam   Alert, pleasant, upbeat, and in no apparent discomfort.  S1 and S2 normal, no murmurs, clicks, gallops or rubs. Regular rate and rhythm. Chest is clear; no wheezes or rales. No edema or JVD.  Past labs reviewed with the patient.                     "

## 2023-04-11 NOTE — PROGRESS NOTES
SUBJECTIVE:  Itz Jurado, a 42 year old male, is seen today in consultatiron regarding vasectomy.  Was seen without his spouse today.  He was presented with the vasectomy instruction packet when roomed; we subsequently discussed the procedure in detail together, including potential risks, expected recovery course, etc.  He understands the small, but present, risk of bleeding, infection, post-vasectomy pain, failure rate, and expresses understanding of the need for back-up contraception until sperm counts are clear at 6-8 weeks.  All concerns addressed and questions answered. He was encouraged to check with his insurance carrier on details of coverage.    Past medical, family, and social history reviewed in his chart.  Review of systems otherwise negative.    OBJECTIVE:    GEN'L:  Alert, pleasant, upbeat, and in no apparent discomfort.  SKIN:  I note only benign skin findings. No unusual rashes or suspicious skin lesions noted. Nails appear normal.  :  Normal penis and scrotum without obvious abnormality.   Vas deferens palpable without difficulty.   Hydrocele present: neither  ASSESSMENT:  Vasectomy consultation    PLAN:  The patient will schedule a vasectomy at his convenience.  Instructed to take valium 10 mg orally 30-45 minutes before the procedure.  He will contact me with any questions or concerns ahead of time.    STEVEN Samuel MD

## 2023-04-23 NOTE — PROGRESS NOTES
Subjective:  HPI  Physical Exam                    Objective:  System    Physical Exam    General     ROS    Assessment/Plan:    SUBJECTIVE  Subjective changes as noted by pt:  Saw primary care provider two weeks ago, recommended take things, PT every other week should be enough now. Xray scheduled next Tuesday. Boris reports that he's having less and less pain, almost feels more comfortable out of the brace. Now sleeps flat on his back, not needing to raise the bed. Can sleep on side for small portion of night. The PT exercises are fine, symptoms mostly when having to to repetitive or prolonged activity with arms in front of him during the day.        Current pain level: 0/10     Changes in function:  Yes (See Goal flowsheet attached for changes in current functional level)     Adverse reaction to treatment or activity:  None    OBJECTIVE  Changes in objective findings:  Yes, B shoulder wand abduction WNL pain free. Tolerated addition of shoulder endurance exercises without pain.         ASSESSMENT  Itz continues to require intervention to meet STG and LTG's: PT  Patient's symptoms are resolving.  Response to therapy has shown an improvement in  pain level  Progress made towards STG/LTG?  Yes (See Goal flowsheet attached for updates on achievement of STG and LTG)    PLAN  Continue current treatment plan until patient demonstrates readiness to progress to higher level exercises.    PTA/ATC plan:  N/A    Please refer to the daily flowsheet for treatment today, total treatment time and time spent performing 1:1 timed codes.

## 2023-04-25 ENCOUNTER — THERAPY VISIT (OUTPATIENT)
Dept: PHYSICAL THERAPY | Facility: CLINIC | Age: 43
End: 2023-04-25
Payer: COMMERCIAL

## 2023-04-25 DIAGNOSIS — S22.069A: Primary | ICD-10-CM

## 2023-04-25 PROCEDURE — 97112 NEUROMUSCULAR REEDUCATION: CPT | Mod: GP | Performed by: PHYSICAL THERAPIST

## 2023-04-25 PROCEDURE — 97110 THERAPEUTIC EXERCISES: CPT | Mod: GP | Performed by: PHYSICAL THERAPIST

## 2023-05-03 ENCOUNTER — OFFICE VISIT (OUTPATIENT)
Dept: NEUROSURGERY | Facility: CLINIC | Age: 43
End: 2023-05-03
Payer: COMMERCIAL

## 2023-05-03 ENCOUNTER — ANCILLARY PROCEDURE (OUTPATIENT)
Dept: GENERAL RADIOLOGY | Facility: CLINIC | Age: 43
End: 2023-05-03
Attending: PHYSICIAN ASSISTANT
Payer: COMMERCIAL

## 2023-05-03 VITALS — DIASTOLIC BLOOD PRESSURE: 88 MMHG | HEART RATE: 101 BPM | OXYGEN SATURATION: 97 % | SYSTOLIC BLOOD PRESSURE: 140 MMHG

## 2023-05-03 DIAGNOSIS — S22.061A CLOSED STABLE BURST FRACTURE OF EIGHTH THORACIC VERTEBRA, INITIAL ENCOUNTER (H): ICD-10-CM
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
## 2023-05-03 PROCEDURE — 72070 X-RAY EXAM THORAC SPINE 2VWS: CPT

## 2023-05-03 PROCEDURE — 99213 OFFICE O/P EST LOW 20 MIN: CPT | Performed by: NURSE PRACTITIONER

## 2023-05-03 NOTE — PROGRESS NOTES
[facility]  Neurosurgery Follow Up:    HPI: 42M with PMH of L4-5 fusion who presents today for an acute T8 burst fracture follow up.  He reports improvement in pain. Denies any radicular pain, paresthesias, or overt weakness. Has been wearing the brace as recommended. States pain increases with repetitive motions of his upper extremities. No new or worsening symptoms.    Medical, surgical, family, and social history unchanged since prior exam.  Exam:  Constitutional:  Alert, well nourished, NAD.  HEENT: Normocephalic, atraumatic.   Pulm:  Without shortness of breath   CV:  No pitting edema of BLE.      Vital Signs:  BP (!) 140/88   Pulse 101   SpO2 97%     Neurological:  Awake  Alert  Oriented x 3  Motor exam:        IP Q DF PF EHL  R   5  5   5   5    5  L   5  5   5   5    5     Able to spontaneously move L/E bilaterally  Sensation intact throughout all L/E dermatomes     Imaging:   Thoracic XR 5/3/2023  IMPRESSION: Grossly normal alignment. Persistent burst type fracture at T8 with slight interval increased height loss now approximately 60-70%, previously 50%. Remaining vertebral body heights are maintained. Mild multilevel disc height loss.     A/P: thoracic burst fracture  Will continue brace as previously recommended. He should follow up in 6 weeks with thoracic XR prior. He verbalized understanding and agreement.    Patient Instructions   -Follow up in 6 weeks with thoracic xray prior.   -Continue to wear your brace as recommended.  -Please contact our clinic with questions or concerns at [phone].      Meenu Molina CNP  [facility]  [address] [place] [id number]  Tel [phone]  Fax [phone]

## 2023-05-03 NOTE — PATIENT INSTRUCTIONS
-Follow up in 6 weeks with thoracic xray prior.   -Continue to wear your brace as recommended.  -Please contact our clinic with questions or concerns at [phone].

## 2023-05-03 NOTE — LETTER
5/3/2023         RE: Itz Jurado  [address]  [place] [address]        Dear Colleague,    Thank you for referring your patient, Itz Jurado, to the [facility] [place]. Please see a copy of my visit note below.    [facility]  Neurosurgery Follow Up:    HPI: 42M with PMH of L4-5 fusion who presents today for an acute T8 burst fracture follow up.  He reports improvement in pain. Denies any radicular pain, paresthesias, or overt weakness. Has been wearing the brace as recommended. States pain increases with repetitive motions of his upper extremities. No new or worsening symptoms.    Medical, surgical, family, and social history unchanged since prior exam.  Exam:  Constitutional:  Alert, well nourished, NAD.  HEENT: Normocephalic, atraumatic.   Pulm:  Without shortness of breath   CV:  No pitting edema of BLE.      Vital Signs:  BP (!) 140/88   Pulse 101   SpO2 97%     Neurological:  Awake  Alert  Oriented x 3  Motor exam:        IP Q DF PF EHL  R   5  5   5   5    5  L   5  5   5   5    5     Able to spontaneously move L/E bilaterally  Sensation intact throughout all L/E dermatomes     Imaging:   Thoracic XR 5/3/2023  IMPRESSION: Grossly normal alignment. Persistent burst type fracture at T8 with slight interval increased height loss now approximately 60-70%, previously 50%. Remaining vertebral body heights are maintained. Mild multilevel disc height loss.     A/P: thoracic burst fracture  Will continue brace as previously recommended. He should follow up in 6 weeks with thoracic XR prior. He verbalized understanding and agreement.    Patient Instructions   -Follow up in 6 weeks with thoracic xray prior.   -Continue to wear your brace as recommended.  -Please contact our clinic with questions or concerns at [phone].      Meenu Molina, OCTAVIO  [facility]  [address] [place] [address]  [address] [place] [address]  Tel  [phone]  Fax [phone]        Again, thank you for allowing me to participate in the care of your patient.        Sincerely,        Meenu Molina NP

## 2023-05-21 ENCOUNTER — HEALTH MAINTENANCE LETTER (OUTPATIENT)
Age: 43
End: 2023-05-21

## 2023-06-08 PROBLEM — S22.069A: Status: RESOLVED | Noted: 2023-04-04 | Resolved: 2023-06-08

## 2023-06-08 NOTE — PROGRESS NOTES
DISCHARGE  Reason for Discharge: Patient has failed to schedule further appointments.    Equipment Issued:     Discharge Plan: Patient to continue home program.    Referring Provider:  Emely Goddard

## 2023-06-15 ENCOUNTER — ANCILLARY PROCEDURE (OUTPATIENT)
Dept: GENERAL RADIOLOGY | Facility: CLINIC | Age: 43
End: 2023-06-15
Attending: NURSE PRACTITIONER
Payer: COMMERCIAL

## 2023-06-15 ENCOUNTER — OFFICE VISIT (OUTPATIENT)
Dept: NEUROSURGERY | Facility: CLINIC | Age: 43
End: 2023-06-15
Payer: COMMERCIAL

## 2023-06-15 ENCOUNTER — TELEPHONE (OUTPATIENT)
Dept: GASTROENTEROLOGY | Facility: CLINIC | Age: 43
End: 2023-06-15

## 2023-06-15 VITALS
HEART RATE: 72 BPM | SYSTOLIC BLOOD PRESSURE: 136 MMHG | BODY MASS INDEX: 29.21 KG/M2 | WEIGHT: 240 LBS | DIASTOLIC BLOOD PRESSURE: 90 MMHG

## 2023-06-15 DIAGNOSIS — S22.061D CLOSED STABLE BURST FRACTURE OF EIGHTH THORACIC VERTEBRA WITH ROUTINE HEALING, SUBSEQUENT ENCOUNTER: Primary | ICD-10-CM

## 2023-06-15 DIAGNOSIS — S22.061A CLOSED STABLE BURST FRACTURE OF EIGHTH THORACIC VERTEBRA, INITIAL ENCOUNTER (H): ICD-10-CM

## 2023-06-15 PROCEDURE — 99213 OFFICE O/P EST LOW 20 MIN: CPT | Performed by: PHYSICIAN ASSISTANT

## 2023-06-15 PROCEDURE — 72070 X-RAY EXAM THORAC SPINE 2VWS: CPT | Performed by: RADIOLOGY

## 2023-06-15 NOTE — PROGRESS NOTES
Neurosurgery 12-week follow-up    Mr. Jurado is a 42-year-old male who is 12 weeks status post fall from a ski accident, resulting in a T8 compression fracture, he has been in a TLSO brace for 3 months now, he denies any back pain, feels like he has healed.  He underwent x-rays today and is here for further review.  His last set of x-rays demonstrated slight interval height loss.  He denies any radicular symptoms today, he states he has been starting to wean his brace and has been doing well with that.    Exam     Alert and oriented no acute distress  No tenderness in the thoracic spine  Bilateral lower extremities with appropriate strength  Gait is normal    Imaging    IMPRESSION: 12 rib bearing thoracic type vertebra. Unchanged alignment including upper thoracic dextrocurvature. Moderate to marked anterior wedge compression/burst fracture at T8 similar to the previous exam. Mild segmental kyphosis measuring 25 degrees   from T7 through T9 is not significantly changed. No new fracture or progressive vertebral height loss. Relatively preserved intervertebral disc heights. Low lung volumes.     Assessment    T8 compression fracture status post ski injury      Plan    Gradually increase activity as tolerated.   Follow up as needed.   Patient may begin to wean his brace gradually over the next week, he can resume physical therapy as needed as well.
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
## 2023-09-07 NOTE — PATIENT INSTRUCTIONS
Patient Education       Proper skin care from [facility]:    -Eliminate harsh soaps as they strip the natural oils from the skin, often resulting in dry itchy skin ( i.e. Dial, Zest, Romansh Spring)  -Use mild soaps such as Cetaphil or Dove Sensitive Skin in the shower. You do not need to use soap on arms, legs, and trunk every time you shower unless visibly soiled.   -Avoid hot or cold showers.  -After showering, lightly dry off and apply moisturizing within 2-3 minutes. This will help trap moisture in the skin.   -Aggressive use of a moisturizer at least 1-2 times a day to the entire body (including -Vanicream, Cetaphil, Aquaphor or Cerave) and moisturize hands after every washing.  -We recommend using moisturizers that come in a tub that needs to be scooped out, not a pump. This has more of an oil base. It will hold moisture in your skin much better than a water base moisturizer. The above recommended are non-pore clogging.      Wear a sunscreen with at least SPF 30 on your face, ears, neck and V of the chest daily. Wear sunscreen on other areas of the body if those areas are exposed to the sun throughout the day. Sunscreens can contain physical and/or chemical blockers. Physical blockers are less likely to clog pores, these include zinc oxide and titanium dioxide. Reapply every two hour and after swimming.     Sunscreen examples: https://www.ewg.org/sunscreen/    UV radiation  UVA radiation remains constant throughout the day and throughout the year. It is a longer wavelength than UVB and therefore penetrates deeper into the skin leading to immediate and delayed tanning, photoaging, and skin cancer. 70-80% of UVA and UVB radiation occurs between the hours of 10am-2pm.  UVB radiation  UVB radiation causes the most harmful effects and is more significant during the summer months. However, snow and ice can reflect UVB radiation leading to skin damage during the winter months as well. UVB radiation is  responsible for tanning, burning, inflammation, delayed erythema (pinkness), pigmentation (brown spots), and skin cancer.     I recommend self monthly full body exams and yearly full body exams with a dermatology provider. If you develop a new or changing lesion please follow up for examination. Most skin cancers are pink and scaly or pink and pearly. However, we do see blue/brown/black skin cancers.  Consider the ABCDEs of melanoma when giving yourself your monthly full body exam ( don't forget the groin, buttocks, feet, toes, etc). A-asymmetry, B-borders, C-color, D-diameter, E-elevation or evolving. If you see any of these changes please follow up in clinic. If you cannot see your back I recommend purchasing a hand held mirror to use with a larger wall mirror.       Checking for Skin Cancer  You can find cancer early by checking your skin each month. There are 3 kinds of skin cancer. They are melanoma, basal cell carcinoma, and squamous cell carcinoma. Doing monthly skin checks is the best way to find new marks or skin changes. Follow the instructions below for checking your skin.   The ABCDEs of checking moles for melanoma   Check your moles or growths for signs of melanoma using ABCDE:   Asymmetry: the sides of the mole or growth don t match  Border: the edges are ragged, notched, or blurred  Color: the color within the mole or growth varies  Diameter: the mole or growth is larger than 6 mm (size of a pencil eraser)  Evolving: the size, shape, or color of the mole or growth is changing (evolving is not shown in the images below)    Checking for other types of skin cancer  Basal cell carcinoma or squamous cell carcinoma have symptoms such as:     A spot or mole that looks different from all other marks on your skin  Changes in how an area feels, such as itching, tenderness, or pain  Changes in the skin's surface, such as oozing, bleeding, or scaliness  A sore that does not heal  New swelling or redness beyond  the border of a mole    Who s at risk?  Anyone can get skin cancer. But you are at greater risk if you have:   Fair skin, light-colored hair, or light-colored eyes  Many moles or abnormal moles on your skin  A history of sunburns from sunlight or tanning beds  A family history of skin cancer  A history of exposure to radiation or chemicals  A weakened immune system  If you have had skin cancer in the past, you are at risk for recurring skin cancer.   How to check your skin  Do your monthly skin checkups in front of a full-length mirror. Check all parts of your body, including your:   Head (ears, face, neck, and scalp)  Torso (front, back, and sides)  Arms (tops, undersides, upper, and lower armpits)  Hands (palms, backs, and fingers, including under the nails)  Buttocks and genitals  Legs (front, back, and sides)  Feet (tops, soles, toes, including under the nails, and between toes)  If you have a lot of moles, take digital photos of them each month. Make sure to take photos both up close and from a distance. These can help you see if any moles change over time.   Most skin changes are not cancer. But if you see any changes in your skin, call your doctor right away. Only he or she can diagnose a problem. If you have skin cancer, seeing your doctor can be the first step toward getting the treatment that could save your life.   [employer] last reviewed this educational content on 4/1/2019 2000-2020 [employer]. [address] [place] [address], [place] [address]. All rights reserved. This information is not intended as a substitute for professional medical care. Always follow your healthcare professional's instructions.       When should I call my doctor?  If you are worsening or not improving, please, contact us or seek urgent care as noted below.     Who should I call with questions (adults)?  [facility] (adult and pediatric): [phone]  [facility]  [place] (adult): [phone]  [facility] ([place], [place], [place] and [place]) [phone]  For urgent needs outside of business hours call the [facility] at [phone] and ask for the dermatology resident on call to be paged  If this is a medical emergency and you are unable to reach an ER, Call 911      If you need a prescription refill, please contact your pharmacy. Refills are approved or denied by our Physicians during normal business hours, Monday through Fridays  Per office policy, refills will not be granted if you have not been seen within the past year (or sooner depending on your child's condition)

## 2023-09-08 ENCOUNTER — OFFICE VISIT (OUTPATIENT)
Dept: DERMATOLOGY | Facility: CLINIC | Age: 43
End: 2023-09-08
Payer: COMMERCIAL

## 2023-09-08 DIAGNOSIS — D48.5 NEOPLASM OF UNCERTAIN BEHAVIOR OF SKIN: ICD-10-CM

## 2023-09-08 PROCEDURE — 99212 OFFICE O/P EST SF 10 MIN: CPT | Performed by: NURSE PRACTITIONER

## 2023-09-08 NOTE — PROGRESS NOTES
[facility] Dermatology Note  Encounter Date: Sep 8, 2023  Office Visit     Reviewed patients past medical history and pertinent chart review prior to patients visit today.     Dermatology Problem List:  Nevus versus lentigo, lower lip    ____________________________________________    Assessment & Plan:     #Nevus versus lentigo, appears benign today.  No worrisome features for malignancy.   If lesion grows, changes, becomes symptomatic, bleeds without trauma, or becomes concerning to patient for any reason I would like to see them back for follow up to recheck for signs of malignancy. I discussed this with patient and patient agrees.      Grace [place], CNP  Dermatology    _______________________________________    CC: Derm Problem (Lip lesion bottom lip x 8-9 months)    HPI:  Mr. Itz Jurado is a(n) 43 year old male who presents today as a new patient for a spot of concern on the lower lip.  About 8 to 9 months ago he noticed a brown spot on his lip that suddenly appeared.  Since he has noticed it it has not changed at all.  It is completely asymptomatic and flat.  It is always been a homogenous color brown.    Patient is otherwise feeling well, without additional skin concerns.      Physical Exam:  SKIN: Focused examination of lips was performed.  -Lower lip, 5 mm homogenous medium brown macule, see photograph    - No other lesions of concern on areas examined.     Medications:  Current Outpatient Medications   Medication    acetaminophen (TYLENOL) 500 MG tablet    cyclobenzaprine (FLEXERIL) 10 MG tablet    diazepam (VALIUM) 10 MG tablet    oxyCODONE IR (ROXICODONE) 10 MG tablet    polyethylene glycol (MIRALAX) 17 GM/Dose powder    senna-docusate (SENOKOT-S/PERICOLACE) 8.6-50 MG tablet     No current facility-administered medications for this visit.      Past Medical History:   Patient Active Problem List   Diagnosis    Epidural hematoma (H)    Family history of colonic polyps    Family  history of early CAD    DDD (degenerative disc disease), lumbar     Past Medical History:   Diagnosis Date    DDD (degenerative disc disease), lumbar 4/11/2023       CC Beena Samuel MD  [address] [place] [address] on close of this encounter.

## 2023-09-08 NOTE — LETTER
9/8/2023         RE: Itz Jurado  [address]  [place] [address]        Dear Colleague,    Thank you for referring your patient, Itz Jurado, to the [facility]. Please see a copy of my visit note below.    [facility] Dermatology Note  Encounter Date: Sep 8, 2023  Office Visit     Reviewed patients past medical history and pertinent chart review prior to patients visit today.     Dermatology Problem List:  Nevus versus lentigo, lower lip    ____________________________________________    Assessment & Plan:     #Nevus versus lentigo, appears benign today.  No worrisome features for malignancy.   If lesion grows, changes, becomes symptomatic, bleeds without trauma, or becomes concerning to patient for any reason I would like to see them back for follow up to recheck for signs of malignancy. I discussed this with patient and patient agrees.      Grace [place], CNP  Dermatology    _______________________________________    CC: Derm Problem (Lip lesion bottom lip x 8-9 months)    HPI:  Mr. Itz Jurado is a(n) 43 year old male who presents today as a new patient for a spot of concern on the lower lip.  About 8 to 9 months ago he noticed a brown spot on his lip that suddenly appeared.  Since he has noticed it it has not changed at all.  It is completely asymptomatic and flat.  It is always been a homogenous color brown.    Patient is otherwise feeling well, without additional skin concerns.      Physical Exam:  SKIN: Focused examination of lips was performed.  -Lower lip, 5 mm homogenous medium brown macule, see photograph    - No other lesions of concern on areas examined.     Medications:  Current Outpatient Medications   Medication     acetaminophen (TYLENOL) 500 MG tablet     cyclobenzaprine (FLEXERIL) 10 MG tablet     diazepam (VALIUM) 10 MG tablet     oxyCODONE IR (ROXICODONE) 10 MG tablet     polyethylene glycol (MIRALAX) 17 GM/Dose powder      senna-docusate (SENOKOT-S/PERICOLACE) 8.6-50 MG tablet     No current facility-administered medications for this visit.      Past Medical History:   Patient Active Problem List   Diagnosis     Epidural hematoma (H)     Family history of colonic polyps     Family history of early CAD     DDD (degenerative disc disease), lumbar     Past Medical History:   Diagnosis Date     DDD (degenerative disc disease), lumbar 4/11/2023        Beena Samuel MD  [address] [place] [address] on close of this encounter.       Again, thank you for allowing me to participate in the care of your patient.        Sincerely,        BABATUNDE Willard CNP

## 2024-03-11 ENCOUNTER — TRANSFERRED RECORDS (OUTPATIENT)
Dept: HEALTH INFORMATION MANAGEMENT | Facility: CLINIC | Age: 44
End: 2024-03-11
Payer: COMMERCIAL

## 2024-03-11 ENCOUNTER — TRANSCRIBE ORDERS (OUTPATIENT)
Dept: OTHER | Age: 44
End: 2024-03-11

## 2024-03-11 DIAGNOSIS — M54.12 CERVICAL RADICULITIS: Primary | ICD-10-CM

## 2024-03-19 ENCOUNTER — THERAPY VISIT (OUTPATIENT)
Dept: PHYSICAL THERAPY | Facility: CLINIC | Age: 44
End: 2024-03-19
Attending: PHYSICAL MEDICINE & REHABILITATION
Payer: COMMERCIAL

## 2024-03-19 DIAGNOSIS — M54.12 CERVICAL RADICULITIS: Primary | ICD-10-CM

## 2024-03-19 PROCEDURE — 97110 THERAPEUTIC EXERCISES: CPT | Mod: GP | Performed by: PHYSICAL THERAPIST

## 2024-03-19 PROCEDURE — 97161 PT EVAL LOW COMPLEX 20 MIN: CPT | Mod: GP | Performed by: PHYSICAL THERAPIST
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
## 2024-03-22 ENCOUNTER — THERAPY VISIT (OUTPATIENT)
Dept: PHYSICAL THERAPY | Facility: CLINIC | Age: 44
End: 2024-03-22
Payer: COMMERCIAL

## 2024-03-22 DIAGNOSIS — M54.12 CERVICAL RADICULITIS: Primary | ICD-10-CM

## 2024-03-22 PROCEDURE — 97110 THERAPEUTIC EXERCISES: CPT | Mod: GP | Performed by: PHYSICAL THERAPIST

## 2024-03-22 PROCEDURE — 97530 THERAPEUTIC ACTIVITIES: CPT | Mod: GP | Performed by: PHYSICAL THERAPIST

## 2024-03-26 ENCOUNTER — THERAPY VISIT (OUTPATIENT)
Dept: PHYSICAL THERAPY | Facility: CLINIC | Age: 44
End: 2024-03-26
Payer: COMMERCIAL

## 2024-03-26 DIAGNOSIS — M54.12 CERVICAL RADICULITIS: Primary | ICD-10-CM

## 2024-03-26 PROCEDURE — 97140 MANUAL THERAPY 1/> REGIONS: CPT | Mod: GP | Performed by: PHYSICAL THERAPIST

## 2024-03-26 PROCEDURE — 97110 THERAPEUTIC EXERCISES: CPT | Mod: GP | Performed by: PHYSICAL THERAPIST

## 2024-04-01 ENCOUNTER — TRANSFERRED RECORDS (OUTPATIENT)
Dept: HEALTH INFORMATION MANAGEMENT | Facility: CLINIC | Age: 44
End: 2024-04-01

## 2024-04-01 ENCOUNTER — THERAPY VISIT (OUTPATIENT)
Dept: PHYSICAL THERAPY | Facility: CLINIC | Age: 44
End: 2024-04-01
Attending: PHYSICAL MEDICINE & REHABILITATION
Payer: COMMERCIAL

## 2024-04-01 DIAGNOSIS — M54.12 CERVICAL RADICULITIS: Primary | ICD-10-CM

## 2024-04-01 PROCEDURE — 97140 MANUAL THERAPY 1/> REGIONS: CPT | Mod: GP | Performed by: PHYSICAL THERAPIST

## 2024-04-01 PROCEDURE — 97112 NEUROMUSCULAR REEDUCATION: CPT | Mod: GP | Performed by: PHYSICAL THERAPIST

## 2024-04-01 PROCEDURE — 97110 THERAPEUTIC EXERCISES: CPT | Mod: GP | Performed by: PHYSICAL THERAPIST

## 2024-04-09 ENCOUNTER — THERAPY VISIT (OUTPATIENT)
Dept: PHYSICAL THERAPY | Facility: CLINIC | Age: 44
End: 2024-04-09
Payer: COMMERCIAL

## 2024-04-09 DIAGNOSIS — M54.12 CERVICAL RADICULITIS: Primary | ICD-10-CM

## 2024-04-09 PROCEDURE — 97110 THERAPEUTIC EXERCISES: CPT | Mod: GP | Performed by: PHYSICAL THERAPIST

## 2024-04-09 PROCEDURE — 97140 MANUAL THERAPY 1/> REGIONS: CPT | Mod: GP | Performed by: PHYSICAL THERAPIST

## 2024-04-16 ENCOUNTER — THERAPY VISIT (OUTPATIENT)
Dept: PHYSICAL THERAPY | Facility: CLINIC | Age: 44
End: 2024-04-16
Payer: COMMERCIAL

## 2024-04-16 DIAGNOSIS — M54.12 CERVICAL RADICULITIS: Primary | ICD-10-CM

## 2024-04-16 PROCEDURE — 97110 THERAPEUTIC EXERCISES: CPT | Mod: GP | Performed by: PHYSICAL THERAPIST

## 2024-04-16 PROCEDURE — 97140 MANUAL THERAPY 1/> REGIONS: CPT | Mod: GP | Performed by: PHYSICAL THERAPIST

## 2024-04-19 ENCOUNTER — THERAPY VISIT (OUTPATIENT)
Dept: PHYSICAL THERAPY | Facility: CLINIC | Age: 44
End: 2024-04-19
Payer: COMMERCIAL

## 2024-04-19 DIAGNOSIS — M54.12 CERVICAL RADICULITIS: Primary | ICD-10-CM

## 2024-04-19 PROCEDURE — 97110 THERAPEUTIC EXERCISES: CPT | Mod: GP | Performed by: PHYSICAL THERAPIST

## 2024-04-19 PROCEDURE — 97140 MANUAL THERAPY 1/> REGIONS: CPT | Mod: GP | Performed by: PHYSICAL THERAPIST

## 2024-04-23 ENCOUNTER — THERAPY VISIT (OUTPATIENT)
Dept: PHYSICAL THERAPY | Facility: CLINIC | Age: 44
End: 2024-04-23
Payer: COMMERCIAL

## 2024-04-23 DIAGNOSIS — M54.12 CERVICAL RADICULITIS: Primary | ICD-10-CM

## 2024-04-23 PROCEDURE — 97110 THERAPEUTIC EXERCISES: CPT | Mod: GP | Performed by: PHYSICAL THERAPIST

## 2024-04-23 PROCEDURE — 97112 NEUROMUSCULAR REEDUCATION: CPT | Mod: GP | Performed by: PHYSICAL THERAPIST

## 2024-04-23 PROCEDURE — 97140 MANUAL THERAPY 1/> REGIONS: CPT | Mod: GP | Performed by: PHYSICAL THERAPIST

## 2024-04-30 ENCOUNTER — THERAPY VISIT (OUTPATIENT)
Dept: PHYSICAL THERAPY | Facility: CLINIC | Age: 44
End: 2024-04-30
Payer: COMMERCIAL

## 2024-04-30 DIAGNOSIS — M54.12 CERVICAL RADICULITIS: Primary | ICD-10-CM

## 2024-04-30 PROCEDURE — 97530 THERAPEUTIC ACTIVITIES: CPT | Mod: GP | Performed by: PHYSICAL THERAPIST

## 2024-04-30 PROCEDURE — 97140 MANUAL THERAPY 1/> REGIONS: CPT | Mod: GP | Performed by: PHYSICAL THERAPIST

## 2024-04-30 PROCEDURE — 97110 THERAPEUTIC EXERCISES: CPT | Mod: GP | Performed by: PHYSICAL THERAPIST

## 2024-07-11 ENCOUNTER — TELEPHONE (OUTPATIENT)
Dept: FAMILY MEDICINE | Facility: CLINIC | Age: 44
End: 2024-07-11
Payer: COMMERCIAL

## 2024-07-11 NOTE — TELEPHONE ENCOUNTER
Pt calling to schedule an appointment to get stitches removed.    States that they were placed on 7/3 outside of [place].   Has about 12 stitches in place.    Assisted in scheduling a provider visit for carrol.      THIERRY UreñaN, RN  [facility]

## 2024-07-11 NOTE — TELEPHONE ENCOUNTER
Pt calling clinic back regarding rescheduling appointment for suture removal.     Per review of note, this RN does not believe pt needs to reschedule anymore. Reviewed Carley Alejandro's schedule for 7/12 at 10:30 and no other patient booked at this time.     Carmelita Gomez RN

## 2024-07-11 NOTE — TELEPHONE ENCOUNTER
Placed call to pt re: scheduling.    Would like to see if pt would be willing to schedule his appointment at a different time tomorrow with one of the other providers, as there is another pt who is also scheduled during the 1030 slot with Carley Alejandro.    If pt calls back, please kindly ask if he would be willing to move his appointment time to 3:30 with Dr. Loco (appt slot held).    Addendum- Please disregard rescheduling. Other patient has agreed to change her appointment instead.    Meenu Guardado, RN, BSN  [facility]

## 2024-07-12 ENCOUNTER — OFFICE VISIT (OUTPATIENT)
Dept: FAMILY MEDICINE | Facility: CLINIC | Age: 44
End: 2024-07-12
Payer: COMMERCIAL

## 2024-07-12 VITALS
SYSTOLIC BLOOD PRESSURE: 122 MMHG | HEART RATE: 83 BPM | TEMPERATURE: 98.8 F | DIASTOLIC BLOOD PRESSURE: 84 MMHG | HEIGHT: 76 IN | WEIGHT: 247.9 LBS | OXYGEN SATURATION: 98 % | RESPIRATION RATE: 10 BRPM | BODY MASS INDEX: 30.19 KG/M2

## 2024-07-12 DIAGNOSIS — L08.9 INFECTED LACERATION OF SKIN: ICD-10-CM

## 2024-07-12 DIAGNOSIS — T14.8XXA INFECTED LACERATION OF SKIN: ICD-10-CM

## 2024-07-12 DIAGNOSIS — S91.311A FOOT LACERATION, RIGHT, INITIAL ENCOUNTER: Primary | ICD-10-CM

## 2024-07-12 PROCEDURE — 99213 OFFICE O/P EST LOW 20 MIN: CPT | Performed by: NURSE PRACTITIONER

## 2024-07-12 RX ORDER — CEPHALEXIN 500 MG/1
500 CAPSULE ORAL 2 TIMES DAILY
Qty: 14 CAPSULE | Refills: 0 | Status: SHIPPED | OUTPATIENT
Start: 2024-07-12 | End: 2024-07-19

## 2024-07-12 NOTE — PROGRESS NOTES
"      Ajit Escalante is a 43 year old, presenting for the following health issues:  Suture Removal      7/12/2024    10:14 AM   Additional Questions   Roomed by Alia     History of Present Illness       Reason for visit:  Suture removal    He eats 2-3 servings of fruits and vegetables daily.He consumes 0 sweetened beverage(s) daily.He exercises with enough effort to increase his heart rate 10 to 19 minutes per day.  He exercises with enough effort to increase his heart rate 5 days per week.   He is taking medications regularly.         Suture removal right foot between big toe and 2nd toe. Suture was placed 7/3/2024 7-14 days to get removed   Was on back of [facility] and was bringing     Review of Systems  Constitutional, HEENT, cardiovascular, pulmonary, gi and gu systems are negative, except as otherwise noted.      Objective    /84 (BP Location: Right arm, Patient Position: Sitting, Cuff Size: Adult Large)   Pulse 83   Temp 98.8  F (37.1  C) (Oral)   Resp 10   Ht 1.93 m (6' 4\")   Wt 112.4 kg (247 lb 14.4 oz)   SpO2 98%   BMI 30.18 kg/m    Body mass index is 30.18 kg/m .  Physical Exam   Patient is well nourished, well developed,in no apparent distress, non-toxic, in no respiratory distress and acyanotic, alert, cooperative, and well hydrated  RESP: lungs clear to auscultation - no rales, rhonchi or wheezes  CV: regular rates and rhythm  MS: extremities normal- no gross deformities noted  ORTHO: Foot Exam: Inspection:  swelling , forefoot  Tender::1st, 2nd metatarsal  Non-tender:midshaft 5th metatarsal  Range of Motion:flexion of toes:  full, extension of toes  full  SKIN: laceration between great and 2nd toe on right foot. If inflamed on center of the wound does not appear ready to have sutures removed   PSYCH: mentation appears normal and affect normal/bright         Assessment & Plan     Foot laceration, right, initial encounter  Will delay getting sutures out about 4 days to allow further healing " "    Infected laceration of skin foot  Elevate and take antibiotics as directed.  If new, worsening or persistent symptoms, the patient is to call or return for a recheck.  Will Start:  - cephALEXin (KEFLEX) 500 MG capsule  Dispense: 14 capsule; Refill: 0        BMI  Estimated body mass index is 30.18 kg/m  as calculated from the following:    Height as of this encounter: 1.93 m (6' 4\").    Weight as of this encounter: 112.4 kg (247 lb 14.4 oz).         See Patient Instructions  Patient Instructions   PLAN:   1.   Symptomatic therapy suggested: will start on keflex for week and will wait on taking out stiches for next week     2.  Orders Placed This Encounter   Medications    cephALEXin (KEFLEX) 500 MG capsule     Sig: Take 1 capsule (500 mg) by mouth 2 times daily for 7 days     Dispense:  14 capsule     Refill:  0     3.     Patient needs to follow up in if no improvement,or sooner if worsening of symptoms or other symptoms develop.              Signed Electronically by: BABATUNDE Phillips CNP    "

## 2024-07-12 NOTE — PATIENT INSTRUCTIONS
PLAN:   1.   Symptomatic therapy suggested: will start on keflex for week and will wait on taking out stiches for next week     2.  Orders Placed This Encounter   Medications    cephALEXin (KEFLEX) 500 MG capsule     Sig: Take 1 capsule (500 mg) by mouth 2 times daily for 7 days     Dispense:  14 capsule     Refill:  0     3.     Patient needs to follow up in if no improvement,or sooner if worsening of symptoms or other symptoms develop.

## 2024-07-18 PROBLEM — S06.4XAA EPIDURAL HEMATOMA (H): Status: RESOLVED | Noted: 2023-03-20 | Resolved: 2024-07-18

## 2024-07-28 ENCOUNTER — HEALTH MAINTENANCE LETTER (OUTPATIENT)
Age: 44
End: 2024-07-28

## 2025-04-11 PROBLEM — S22.061A: Status: ACTIVE | Noted: 2023-03-19

## 2025-04-11 PROBLEM — I21.9 MYOCARDIAL INFARCTION (H): Status: ACTIVE | Noted: 2025-04-08

## 2025-04-11 PROBLEM — I50.23 ACUTE ON CHRONIC SYSTOLIC CONGESTIVE HEART FAILURE (H): Status: ACTIVE | Noted: 2025-04-09

## 2025-04-11 PROBLEM — I24.9: Status: ACTIVE | Noted: 2025-04-08

## 2025-04-15 ENCOUNTER — OFFICE VISIT (OUTPATIENT)
Dept: FAMILY MEDICINE | Facility: CLINIC | Age: 45
End: 2025-04-15
Payer: COMMERCIAL

## 2025-04-15 VITALS
WEIGHT: 224.2 LBS | HEART RATE: 78 BPM | TEMPERATURE: 97.8 F | DIASTOLIC BLOOD PRESSURE: 79 MMHG | HEIGHT: 75 IN | RESPIRATION RATE: 14 BRPM | BODY MASS INDEX: 27.88 KG/M2 | SYSTOLIC BLOOD PRESSURE: 118 MMHG | OXYGEN SATURATION: 95 %

## 2025-04-15 DIAGNOSIS — I25.119 CORONARY ARTERY DISEASE INVOLVING NATIVE CORONARY ARTERY OF NATIVE HEART WITH ANGINA PECTORIS: ICD-10-CM

## 2025-04-15 DIAGNOSIS — E78.5 HYPERLIPIDEMIA LDL GOAL <70: ICD-10-CM

## 2025-04-15 DIAGNOSIS — M54.6 MIDLINE THORACIC BACK PAIN, UNSPECIFIED CHRONICITY: Primary | ICD-10-CM

## 2025-04-15 DIAGNOSIS — I25.5 ISCHEMIC CARDIOMYOPATHY: ICD-10-CM

## 2025-04-15 PROCEDURE — 3074F SYST BP LT 130 MM HG: CPT | Performed by: FAMILY MEDICINE

## 2025-04-15 PROCEDURE — 99214 OFFICE O/P EST MOD 30 MIN: CPT | Performed by: FAMILY MEDICINE

## 2025-04-15 PROCEDURE — 3078F DIAST BP <80 MM HG: CPT | Performed by: FAMILY MEDICINE

## 2025-04-15 RX ORDER — OXYCODONE HYDROCHLORIDE 5 MG/1
5-10 TABLET ORAL 3 TIMES DAILY PRN
Qty: 20 TABLET | Refills: 0 | Status: SHIPPED | OUTPATIENT
Start: 2025-04-15

## 2025-04-15 RX ORDER — TRAMADOL HYDROCHLORIDE 50 MG/1
50 TABLET ORAL 3 TIMES DAILY PRN
Qty: 20 TABLET | Refills: 0 | Status: SHIPPED | OUTPATIENT
Start: 2025-04-15

## 2025-04-15 ASSESSMENT — ENCOUNTER SYMPTOMS: BACK PAIN: 1

## 2025-04-15 NOTE — PATIENT INSTRUCTIONS
Plan:    -Tramadol for milder pain.  Less drowsy.    - Oxycodone for more significant pain.  More drowsy.    - Can also use Tylenol up to 4000 mg daily (2 tablets of extra strength Tylenol 4 times daily)    - Once cleared by the cardiology folks you can start ramping up walking, exercise, etc.    - And if needed we can get you into see orthopedic specialist or physical therapy or what ever.  We can be in good contact about this and you can send me a message anytime.

## 2025-04-15 NOTE — PROGRESS NOTES
"  Assessment & Plan     Midline thoracic back pain, unspecified chronicity  Having some significant upper back pain and now a little bit in his lower back that may be related to last year's thoracic fracture.  That is an issue I knew about.  But he was also in the hospital recently for an acute MI and being in bed and on the table for a couple of procedures, etc. has really flared things up.  Have to be cautious because at this point we cannot use NSAIDs.  That should settle down at some point in the future but not after the acute MI while on Effient.  So in the meantime we can use a mixture of oxycodone and tramadol depending on time of the day.  Discussed that he can use  Tylenol in addition.  His activity level is going to be regulated initially by cardiology but once that is back to full we can start ramping up exercise and if need be get him referred to physical therapy and/or sports medicine.  Might be a little additional work to do.  - traMADol (ULTRAM) 50 MG tablet; Take 1 tablet (50 mg) by mouth 3 times daily as needed for pain.  - oxyCODONE (ROXICODONE) 5 MG tablet; Take 1-2 tablets (5-10 mg) by mouth 3 times daily as needed for moderate to severe pain.    Coronary artery disease involving native coronary artery of native heart with angina pectoris  Reviewed hospital records with patient and in his chart.  Stented in 2 vessels.  There is some thought about this being related to levels of lipoprotein a and more will be known about that in the upcoming years including possible treatment options.  But for now we are going to continue to work on standard measures.  Further plans per cardiology.    Ischemic cardiomyopathy  As above    Hyperlipidemia LDL goal <70  As above          BMI  Estimated body mass index is 27.73 kg/m  as calculated from the following:    Height as of this encounter: 1.915 m (6' 3.39\").    Weight as of this encounter: 101.7 kg (224 lb 3.2 oz).         See Patient " "Instructions    Ajit Escalante is a 44 year old, presenting for the following health issues:  Back Pain        4/15/2025    10:51 AM   Additional Questions   Roomed by Alia     History of Present Illness       Back Pain:  He presents for follow up of back pain. Patient's back pain is a chronic problem.  Location of back pain:  Right lower back, left lower back, right middle of back, left middle of back, right upper back and left upper back  Description of back pain: fullness and sharp  Back pain spreads: nowhere    Since patient first noticed back pain, pain is: gradually worsening  Does back pain interfere with his job:  Yes       He eats 2-3 servings of fruits and vegetables daily.He consumes 1 sweetened beverage(s) daily.He exercises with enough effort to increase his heart rate 10 to 19 minutes per day.  He exercises with enough effort to increase his heart rate 3 or less days per week.   He is taking medications regularly.            Here today for back pain as above.  Follow-up on recent MI.      Review of Systems  Constitutional, HEENT, cardiovascular, pulmonary, gi and gu systems are negative, except as otherwise noted.      Objective    /79 (BP Location: Right arm, Patient Position: Sitting, Cuff Size: Adult Regular)   Pulse 78   Temp 97.8  F (36.6  C) (Oral)   Resp 14   Ht 1.915 m (6' 3.39\")   Wt 101.7 kg (224 lb 3.2 oz)   SpO2 95%   BMI 27.73 kg/m    Body mass index is 27.73 kg/m .  Physical Exam   Alert, pleasant, upbeat, and in no apparent discomfort.  S1 and S2 normal, no murmurs, clicks, gallops or rubs. Regular rate and rhythm. Chest is clear; no wheezes or rales. No edema or JVD.  BACK - Good range of motion with straight leg raising negative bilaterally.  No significant tenderness to palpation.  CMS intact lower extremities bilaterally.  Normal deep tendon reflexes bilaterally.   Past labs reviewed with the patient.   Reviewed previous imaging            Signed Electronically by: " Beena Samuel MD

## 2025-05-12 ENCOUNTER — OFFICE VISIT (OUTPATIENT)
Dept: PHYSICAL MEDICINE AND REHAB | Facility: CLINIC | Age: 45
End: 2025-05-12
Payer: COMMERCIAL

## 2025-05-12 ENCOUNTER — OFFICE VISIT (OUTPATIENT)
Dept: FAMILY MEDICINE | Facility: CLINIC | Age: 45
End: 2025-05-12
Payer: COMMERCIAL

## 2025-05-12 VITALS
OXYGEN SATURATION: 97 % | BODY MASS INDEX: 27.85 KG/M2 | SYSTOLIC BLOOD PRESSURE: 118 MMHG | WEIGHT: 224 LBS | TEMPERATURE: 97.4 F | RESPIRATION RATE: 15 BRPM | HEIGHT: 75 IN | DIASTOLIC BLOOD PRESSURE: 75 MMHG | HEART RATE: 83 BPM

## 2025-05-12 VITALS
DIASTOLIC BLOOD PRESSURE: 67 MMHG | HEART RATE: 76 BPM | HEIGHT: 75 IN | SYSTOLIC BLOOD PRESSURE: 145 MMHG | BODY MASS INDEX: 26.24 KG/M2 | WEIGHT: 211 LBS

## 2025-05-12 DIAGNOSIS — G47.00 INSOMNIA, UNSPECIFIED TYPE: ICD-10-CM

## 2025-05-12 DIAGNOSIS — F41.9 ANXIETY: Primary | ICD-10-CM

## 2025-05-12 DIAGNOSIS — M54.50 LUMBAR SPINE PAIN: Primary | ICD-10-CM

## 2025-05-12 DIAGNOSIS — Z98.1 HISTORY OF LUMBAR FUSION: ICD-10-CM

## 2025-05-12 DIAGNOSIS — S22.061D CLOSED STABLE BURST FRACTURE OF EIGHTH THORACIC VERTEBRA WITH ROUTINE HEALING, SUBSEQUENT ENCOUNTER: ICD-10-CM

## 2025-05-12 DIAGNOSIS — M54.6 MIDLINE THORACIC BACK PAIN, UNSPECIFIED CHRONICITY: ICD-10-CM

## 2025-05-12 DIAGNOSIS — I25.119 CORONARY ARTERY DISEASE INVOLVING NATIVE CORONARY ARTERY OF NATIVE HEART WITH ANGINA PECTORIS: ICD-10-CM

## 2025-05-12 DIAGNOSIS — E78.5 HYPERLIPIDEMIA LDL GOAL <70: ICD-10-CM

## 2025-05-12 DIAGNOSIS — S22.069S CLOSED FRACTURE OF EIGHTH THORACIC VERTEBRA, UNSPECIFIED FRACTURE MORPHOLOGY, SEQUELA: ICD-10-CM

## 2025-05-12 DIAGNOSIS — Z12.11 COLON CANCER SCREENING: ICD-10-CM

## 2025-05-12 DIAGNOSIS — M54.16 LUMBAR RADICULAR PAIN: ICD-10-CM

## 2025-05-12 PROCEDURE — G2211 COMPLEX E/M VISIT ADD ON: HCPCS | Performed by: FAMILY MEDICINE

## 2025-05-12 PROCEDURE — 3077F SYST BP >= 140 MM HG: CPT | Performed by: PHYSICAL MEDICINE & REHABILITATION

## 2025-05-12 PROCEDURE — 3078F DIAST BP <80 MM HG: CPT | Performed by: PHYSICAL MEDICINE & REHABILITATION

## 2025-05-12 PROCEDURE — 99214 OFFICE O/P EST MOD 30 MIN: CPT | Performed by: FAMILY MEDICINE

## 2025-05-12 PROCEDURE — 1125F AMNT PAIN NOTED PAIN PRSNT: CPT | Performed by: PHYSICAL MEDICINE & REHABILITATION

## 2025-05-12 PROCEDURE — 1125F AMNT PAIN NOTED PAIN PRSNT: CPT | Performed by: FAMILY MEDICINE

## 2025-05-12 PROCEDURE — 99204 OFFICE O/P NEW MOD 45 MIN: CPT | Performed by: PHYSICAL MEDICINE & REHABILITATION

## 2025-05-12 PROCEDURE — G2211 COMPLEX E/M VISIT ADD ON: HCPCS | Performed by: PHYSICAL MEDICINE & REHABILITATION

## 2025-05-12 PROCEDURE — 3074F SYST BP LT 130 MM HG: CPT | Performed by: FAMILY MEDICINE

## 2025-05-12 PROCEDURE — 3078F DIAST BP <80 MM HG: CPT | Performed by: FAMILY MEDICINE

## 2025-05-12 RX ORDER — ZOLPIDEM TARTRATE 10 MG/1
10 TABLET ORAL
Qty: 30 TABLET | Refills: 0 | Status: SHIPPED | OUTPATIENT
Start: 2025-05-12

## 2025-05-12 RX ORDER — HYDROXYZINE HYDROCHLORIDE 25 MG/1
25 TABLET, FILM COATED ORAL 3 TIMES DAILY PRN
Qty: 30 TABLET | Refills: 0 | Status: SHIPPED | OUTPATIENT
Start: 2025-05-12

## 2025-05-12 RX ORDER — DULOXETIN HYDROCHLORIDE 30 MG/1
30 CAPSULE, DELAYED RELEASE ORAL 2 TIMES DAILY
Qty: 30 CAPSULE | Refills: 0 | Status: SHIPPED | OUTPATIENT
Start: 2025-05-12

## 2025-05-12 ASSESSMENT — PAIN SCALES - GENERAL
PAINLEVEL_OUTOF10: MODERATE PAIN (4)
PAINLEVEL_OUTOF10: MILD PAIN (3)

## 2025-05-12 NOTE — PATIENT INSTRUCTIONS
"Plan:    1) start duloxetine 30 mg daily.  This is a medicine in the \"antidepressant/antianxiety\" category that also seems to help a little bit with ongoing neurologic pain (like back pain).  This is a very small amount that we can try for a couple of weeks and see if it helps with some of the stress.  We can touch base at any time to decide whether to continue or increase or what ever.      2) hydroxyzine can help with muscle spasm and we also use it on an as-needed basis for anxiety.    3) zolpidem for sleep  "

## 2025-05-12 NOTE — LETTER
5/12/2025      Itz Jurado  [address]  [place] [address]      Dear Colleague,    Thank you for referring your patient, Itz Jurado, to the [facility]. Please see a copy of my visit note below.    Assessment/Plan:      Boris was seen today for neck pain.    Diagnoses and all orders for this visit:    Lumbar spine pain  -     MR Thoracic Spine w/o Contrast; Future  -     MR Lumbar Spine w/o Contrast; Future  -     Physical Therapy  Referral; Future    Lumbar radicular pain  -     MR Thoracic Spine w/o Contrast; Future  -     MR Lumbar Spine w/o Contrast; Future  -     Physical Therapy  Referral; Future    Closed fracture of eighth thoracic vertebra, unspecified fracture morphology, sequela  -     MR Thoracic Spine w/o Contrast; Future  -     MR Lumbar Spine w/o Contrast; Future  -     Physical Therapy  Referral; Future    History of lumbar fusion  -     MR Thoracic Spine w/o Contrast; Future  -     MR Lumbar Spine w/o Contrast; Future  -     Physical Therapy  Referral; Future         Assessment: Pleasant 44 year old male with history of coronary artery disease with recent stent placement, hypertension hyperlipidemia, status post L4-5 fusion and T8 fracture with:    1.  Chronic lumbar spine pain with progressive 4-month history of painful lumbosacral junction mid lumbar spine with intermittent lower extremity radicular pain.  A couple of episodes of urinary retention type symptoms or slow bladder emptying.  History of L4-5 fusion.    2.  Thoracic spine pain throughout the thoracic spine upper and mid thoracic spine status post T8 fracture.  Treated nonoperatively.  Appears to be a burst fracture on prior MRI resulting in moderate central stenosis.  May have some progressed spinal stenosis at that level.  Given the radicular symptoms and couple episodes of bladder symptoms need to evaluate for progressive thoracic stenosis.    3.   Myofascial pain thoracic spine..      Discussion:    1.  We discussed the diagnosis and treatment options.  Need to evaluate for worsening spinal stenosis in the thoracic spine also need to evaluate for any spinal stenosis in the lumbar spine.  He reports having physical therapy in the past last therapy was about a year ago for cervical and arm symptoms.  Has had recent cardiac stenting which may also exacerbate upper thoracic pain.    2.  MRI of the lumbar spine evaluate for stenosis above or below the L4-5 fusion.    3.  MRI thoracic spine evaluate for progress stenosis around T8.    4.  Physical therapy for lumbar strengthening stabilization.    5.  He was just given meds from his PCP including Cymbalta and Atarax which are good options for him and she tried those medications for pain per PCP.    6.  Follow-up with me 1 month.      It was our pleasure caring for your patient today, if there any questions or concerns please do not hesitate to contact us.      Subjective:   Patient ID: Itz Jurado is a 44 year old male.    History of Present Illness: Patient presents for evaluation of thoracic spine pain muscle lumbar spine pain.  He has a history of multiple surgeries while living in [place].  This include microdiscectomy and subsequent L4-5 fusion in 2017 per his report.  He has had intermittent low back pain since and also in 2023 was skiing up a hill and fell and sustained a burst fracture of T8 treated nonoperatively.  Over time has had pain in physical therapy however about 4 months ago without any new injury began having significant increased low back pain and thoracic spine pain.  This is throughout the entirety of the thoracic and lumbar spine but also has intermittent lightening bolt down his legs otherwise no paresthesias or weakness in the legs no paresthesias in the arms.  Pain is relatively constant worse with any standing walking prolonged sitting.  Better with heating pad and laying down.   "Takes oxycodone at night which helps along with occasional tramadol.  Also 1 month ago had several cardiac stents for myocardial infarction.  Was given Cymbalta today and Atarax from PCP for pain.  Again no paresthesias or weakness in the legs.  Has had a couple episodes of difficulty emptying his bladder.  His pain is an 8/10 at worst 4/10 today 2/10 at best.      Imaging: MRI of the thoracic spine images personally reviewed from 2023 that shows a T8 burst fracture resulting in moderate central stenosis no high-grade spinal cord compression.  There was a small epidural hematoma at the time.  More recent X-rays of the thoracic spine June 2023 reveal moderate to marked anterior wedging of T8 similar to prior x-ray kyphosis 25 degrees.         Review of Systems: Complains of weight loss, chest pain, shortness of breath, abdominal pain, muscle pain muscle fatigue \"sciatica \"poor bladder control/poor emptying, poor sleep anxiety depression.  Denies fever, weight gain, headache, change in vision, palpitations, cough, diarrhea, constipation, bowel or bladder incontinence, joint pain, skin itching, poor balance, depression or suicidal thoughts.  Remainder of 12 point review systems negative unless listed above.      Current Outpatient Medications   Medication Sig Dispense Refill     aspirin (ASA) 81 MG EC tablet Take 81 mg by mouth.       DULoxetine (CYMBALTA) 30 MG capsule Take 1 capsule (30 mg) by mouth 2 times daily. 30 capsule 0     hydrOXYzine HCl (ATARAX) 25 MG tablet Take 1 tablet (25 mg) by mouth 3 times daily as needed (Anxiety or back pain). 30 tablet 0     LORazepam (ATIVAN) 0.5 MG tablet Take 1 tablet (0.5 mg) by mouth every 6 hours as needed for anxiety. 10 tablet 0     methocarbamol (ROBAXIN) 500 MG tablet        metoprolol succinate ER (TOPROL XL) 25 MG 24 hr tablet Take 1 tablet by mouth daily.       oxyCODONE (ROXICODONE) 5 MG tablet Take 1-2 tablets (5-10 mg) by mouth 3 times daily as needed for moderate " to severe pain. 20 tablet 0     prasugrel (EFFIENT) 10 MG TABS tablet Take 1 tablet by mouth daily.       rosuvastatin (CRESTOR) 40 MG tablet        traMADol (ULTRAM) 50 MG tablet Take 1 tablet (50 mg) by mouth 3 times daily as needed for pain. 20 tablet 0     zolpidem (AMBIEN) 10 MG tablet Take 1 tablet (10 mg) by mouth nightly as needed for sleep. 30 tablet 0     No current facility-administered medications for this visit.       Past Medical History:   Diagnosis Date     DDD (degenerative disc disease), lumbar 04/11/2023     Epidural hematoma (H) 03/20/2023       Family History   Problem Relation Age of Onset     Cerebrovascular Disease Mother      Thyroid Disease Mother      Coronary Artery Disease Father      Hypertension Father      Hyperlipidemia Father      Cerebrovascular Disease Father      Prostate Cancer Paternal Grandfather          Social History     Socioeconomic History     Marital status:      Spouse name: None     Number of children: None     Years of education: None     Highest education level: None   Tobacco Use     Smoking status: Never     Passive exposure: Never     Smokeless tobacco: Never   Vaping Use     Vaping status: Never Used   Substance and Sexual Activity     Alcohol use: Yes     Comment: 2-3 per week     Drug use: Never     Sexual activity: Yes     Partners: Female     Birth control/protection: I.U.D.   Other Topics Concern     Parent/sibling w/ CABG, MI or angioplasty before 65F 55M? No     Social Drivers of Health     Food Insecurity: No Food Insecurity (4/8/2025)    [facility]     Hunger Vital Sign      Worried About Running Out of Food in the Last Year: Never true      Ran Out of Food in the Last Year: Never true   Transportation Needs: No Transportation Needs (4/8/2025)    Received from [facility] - Transportation      Lack of Transportation (Medical): No      Lack of Transportation (Non-Medical): No   Interpersonal Safety:  Not At Risk (5/10/2025)    Received from [facility]     Humiliation, Afraid, Rape, and Kick questionnaire      Fear of Current or Ex-Partner: No      Emotionally Abused: No      Physically Abused: No      Sexually Abused: No   Housing Stability: Low Risk  (4/8/2025)    Received from [facility]     Housing Stability Vital Sign      Unable to Pay for Housing in the Last Year: No      Number of Times Moved in the Last Year: 0      Homeless in the Last Year: No     Social history: .  2 children.  No tobacco.  Does drink alcohol    The following portions of the patient's history were reviewed and updated as appropriate: allergies, current medications, past family history, past medical history, past social history, past surgical history and problem list.    Oswestry ([facility]) Questionnaire        5/12/2025     7:47 AM   OSWESTRY DISABILITY INDEX   Count 10    Sum 18    Oswestry Score (%) 36 %        Patient-reported       Neck Disability Index:      5/12/2025     7:48 AM   Neck Disability Index (  Prosper H. and Svetlana C. 1991. All rights reserved.; used with permission)   SECTION 1 - PAIN INTENSITY 0   SECTION 2 - PERSONAL CARE 0   SECTION 3 - LIFTING 0   SECTION 4 - READING 0   SECTION 5 - HEADACHES 0   SECTION 6 - CONCENTRATION 0   SECTION 7 - WORK 0   SECTION 8 - DRIVING 0   SECTION 9 - SLEEPING 0   SECTION 10 - RECREATION 0   Count 10    Sum 0    Raw Score: /50 0    Neck Disability Index Score: (%) 0 %        Patient-reported          PHQ-2 Score:         5/12/2025     7:40 AM 4/24/2025     7:01 PM   PHQ-2 ( 1999 [employer])   Q1: Little interest or pleasure in doing things 1 0   Q2: Feeling down, depressed or hopeless 1 1   PHQ-2 Score 2  1    Q1: Little interest or pleasure in doing things Several days Not at all   Q2: Feeling down, depressed or hopeless Several days Several days   PHQ-2 Score 2 1       Patient-reported                  Objective:   Physical Exam:    BP (!) 145/67   Pulse 76    "Ht 6' 3.39\" (1.915 m)   Wt 211 lb (95.7 kg)   BMI 26.10 kg/m    Body mass index is 26.1 kg/m .      General:  Well-appearing male in no acute distress.  Pleasant, cooperative, and interactive throughout the examination and interview.  CV: No lower extremity edema on inspection or paltation.  Lymphatics: No cervical lymphadenopathy palpated. Eyes: sclera clear. Skin: No rashes or lesions seen over the head/neck, hairline, arms, legs   Respirations unlabored.  MSK: Gait is nonantalgic.  Able to heel-toe walk without difficulty.  Negative Romberg.  Spine: normal AP curves of the C, T, and L spine.   Mildly reduced lumbar flexion finger to floor testing.  Palpation: No significant tenderness to palpation thoracic or lumbar spinous process or paraspinal tissues.  Extremities: Full range of motion of the elbows, and wrists with no effusions or tenderness to palpation.   Full range of motion of the hips, knees, and ankles with no effusions or tenderness to palpation.   No hypermobility of the upper or lower extremities.  Neurologic exam: Mental status: Patient is alert and oriented with normal affect.  Attention, knowledge, memory, and language are intact.  Normal coordination throughout the examination.  Reflexes are 2+ and symmetric biceps, triceps, brachioradialis, patellar, and Achilles with down-going toes and Negative Vincent's.  Sensation is intact to light touch throughout the upper and lower extremities bilaterally.  Manual muscle testing reveals 5 out of 5 in the hip flexors, knee flexors/extensors, ankle plantar flexors, ankle  dorsiflexors, and EHL.  Upper extremities: Grossly normal strength . Normal muscle bulk and tone in the arms and legs.    Negative seated  straight leg raise bilaterally.        Again, thank you for allowing me to participate in the care of your patient.        Sincerely,        Mu Guy DO    Electronically signed"

## 2025-05-12 NOTE — PROGRESS NOTES
Assessment & Plan     Anxiety  Patient well-known to me and we are still dealing with the sequela of recent heart issues.  Saw cardiology in follow-up and things are looking good at this point.  But he still struggling with the overall anxiety of the situation.  Had a very busy day recently and eventually he developed some substernal chest pain that was concerning so ended up being brought by ambulance to the hospital and checked out okay.  But his daughter had to go with him in the ambulance patient feels that he is just struggling somewhat mentally with all of this stress.  Has not previously had issues of anxiety or depression per se.  So we discussed that mental health issues can follow any illness, and depression specifically has been tied to myocardial infarctions.  He does not necessarily think he is depressed, more so stressed.  And so we talked about how to help with all of this.  Sleep has not been good.  Still dealing with some back pain issues and is seeing orthopedics later today.  So I want to start some low-dose Cymbalta and as needed hydroxyzine.  Contact me in a couple of weeks for continuance or adjustment.  - DULoxetine (CYMBALTA) 30 MG capsule; Take 1 capsule (30 mg) by mouth 2 times daily.  - hydrOXYzine HCl (ATARAX) 25 MG tablet; Take 1 tablet (25 mg) by mouth 3 times daily as needed (Anxiety or back pain).    Insomnia, unspecified type  Discussed mechanism of action of the proposed medication, as well as potential effects, both good and bad.  Patient expressed understanding and agreed with treatment.   - zolpidem (AMBIEN) 10 MG tablet; Take 1 tablet (10 mg) by mouth nightly as needed for sleep.    Coronary artery disease involving native coronary artery of native heart with angina pectoris  Recheck lab work in about a month including lipoprotein a and homocystine  - Lipid panel reflex to direct LDL Fasting; Future  - Lipoprotein (a); Future  - Homocysteine; Future    Hyperlipidemia LDL goal  "<70  As above     Midline thoracic back pain, unspecified chronicity  As above   - DULoxetine (CYMBALTA) 30 MG capsule; Take 1 capsule (30 mg) by mouth 2 times daily.  - hydrOXYzine HCl (ATARAX) 25 MG tablet; Take 1 tablet (25 mg) by mouth 3 times daily as needed (Anxiety or back pain).    Closed stable burst fracture of eighth thoracic vertebra with routine healing, subsequent encounter  Seeing orthopedics later today    Colon cancer screening  Because he is on Effient I would not want to do a colonoscopy for basically a year.  But we may discuss more about this later and talk about Cologuard testing.        MED REC REQUIRED  Post Medication Reconciliation Status:         Ajit Escalante is a 44 year old, presenting for the following health issues:  Hospital F/U        5/12/2025     8:28 AM   Additional Questions   Roomed by Benjamin   Accompanied by SELF         5/12/2025     8:28 AM   Patient Reported Additional Medications   Patient reports taking the following new medications NO     HPI        ED/UC Followup:    Facility:  [facility]   Date of visit: 5/10/2025  Reason for visit: CHEST PAIN  Current Status: OKAY PHYSICALLY, NOT MENTALLY     Here today in follow-up of heart issues and anxiety      Review of Systems  Constitutional, HEENT, cardiovascular, pulmonary, gi and gu systems are negative, except as otherwise noted.      Objective    /75 (BP Location: Right arm, Patient Position: Sitting, Cuff Size: Adult Regular)   Pulse 83   Temp 97.4  F (36.3  C) (Tympanic)   Resp 15   Ht 1.915 m (6' 3.39\")   Wt 101.6 kg (224 lb)   SpO2 97%   BMI 27.71 kg/m    Body mass index is 27.71 kg/m .  Physical Exam   Alert, pleasant, upbeat, and in no apparent discomfort.  S1 and S2 normal, no murmurs, clicks, gallops or rubs. Regular rate and rhythm. Chest is clear; no wheezes or rales. No edema or JVD.  Past labs reviewed with the patient.     The longitudinal plan of care for the " diagnosis(es)/condition(s) as documented were addressed during this visit. Due to the added complexity in care, I will continue to support Max in the subsequent management and with ongoing continuity of care.        Signed Electronically by: Beena Samuel MD

## 2025-05-12 NOTE — PATIENT INSTRUCTIONS
An MRI was ordered for you today.  You will be contacted by scheduling within 3 days.    If you are not contacted, please call Radiology at [phone].     A physical therapy order was provided for you today.  You will be contacted by physical therapy.  If nobody contacts you within 3 to 5 days, please contact the clinic at [phone].  It will be very important for you to do your physical therapy exercises on a regular basis to decrease your pain and prevent future pain flares.   Continue with medications per primary care provider

## 2025-05-12 NOTE — PROGRESS NOTES
Assessment/Plan:      Max was seen today for neck pain.    Diagnoses and all orders for this visit:    Lumbar spine pain  -     MR Thoracic Spine w/o Contrast; Future  -     MR Lumbar Spine w/o Contrast; Future  -     Physical Therapy  Referral; Future    Lumbar radicular pain  -     MR Thoracic Spine w/o Contrast; Future  -     MR Lumbar Spine w/o Contrast; Future  -     Physical Therapy  Referral; Future    Closed fracture of eighth thoracic vertebra, unspecified fracture morphology, sequela  -     MR Thoracic Spine w/o Contrast; Future  -     MR Lumbar Spine w/o Contrast; Future  -     Physical Therapy  Referral; Future    History of lumbar fusion  -     MR Thoracic Spine w/o Contrast; Future  -     MR Lumbar Spine w/o Contrast; Future  -     Physical Therapy  Referral; Future         Assessment: Pleasant 44 year old male with history of coronary artery disease with recent stent placement, hypertension hyperlipidemia, status post L4-5 fusion and T8 fracture with:    1.  Chronic lumbar spine pain with progressive 4-month history of painful lumbosacral junction mid lumbar spine with intermittent lower extremity radicular pain.  A couple of episodes of urinary retention type symptoms or slow bladder emptying.  History of L4-5 fusion.    2.  Thoracic spine pain throughout the thoracic spine upper and mid thoracic spine status post T8 fracture.  Treated nonoperatively.  Appears to be a burst fracture on prior MRI resulting in moderate central stenosis.  May have some progressed spinal stenosis at that level.  Given the radicular symptoms and couple episodes of bladder symptoms need to evaluate for progressive thoracic stenosis.    3.  Myofascial pain thoracic spine..      Discussion:    1.  We discussed the diagnosis and treatment options.  Need to evaluate for worsening spinal stenosis in the thoracic spine also need to evaluate for any spinal stenosis in the lumbar spine.  He  reports having physical therapy in the past last therapy was about a year ago for cervical and arm symptoms.  Has had recent cardiac stenting which may also exacerbate upper thoracic pain.    2.  MRI of the lumbar spine evaluate for stenosis above or below the L4-5 fusion.    3.  MRI thoracic spine evaluate for progress stenosis around T8.    4.  Physical therapy for lumbar strengthening stabilization.    5.  He was just given meds from his PCP including Cymbalta and Atarax which are good options for him and she tried those medications for pain per PCP.    6.  Follow-up with me 1 month.      It was our pleasure caring for your patient today, if there any questions or concerns please do not hesitate to contact us.      Subjective:   Patient ID: Itz Jurado is a 44 year old male.    History of Present Illness: Patient presents for evaluation of thoracic spine pain muscle lumbar spine pain.  He has a history of multiple surgeries while living in [place].  This include microdiscectomy and subsequent L4-5 fusion in 2017 per his report.  He has had intermittent low back pain since and also in 2023 was skiing up a hill and fell and sustained a burst fracture of T8 treated nonoperatively.  Over time has had pain in physical therapy however about 4 months ago without any new injury began having significant increased low back pain and thoracic spine pain.  This is throughout the entirety of the thoracic and lumbar spine but also has intermittent lightening bolt down his legs otherwise no paresthesias or weakness in the legs no paresthesias in the arms.  Pain is relatively constant worse with any standing walking prolonged sitting.  Better with heating pad and laying down.  Takes oxycodone at night which helps along with occasional tramadol.  Also 1 month ago had several cardiac stents for myocardial infarction.  Was given Cymbalta today and Atarax from PCP for pain.  Again no paresthesias or weakness in the legs.  Has  "had a couple episodes of difficulty emptying his bladder.  His pain is an 8/10 at worst 4/10 today 2/10 at best.      Imaging: MRI of the thoracic spine images personally reviewed from 2023 that shows a T8 burst fracture resulting in moderate central stenosis no high-grade spinal cord compression.  There was a small epidural hematoma at the time.  More recent X-rays of the thoracic spine June 2023 reveal moderate to marked anterior wedging of T8 similar to prior x-ray kyphosis 25 degrees.         Review of Systems: Complains of weight loss, chest pain, shortness of breath, abdominal pain, muscle pain muscle fatigue \"sciatica \"poor bladder control/poor emptying, poor sleep anxiety depression.  Denies fever, weight gain, headache, change in vision, palpitations, cough, diarrhea, constipation, bowel or bladder incontinence, joint pain, skin itching, poor balance, depression or suicidal thoughts.  Remainder of 12 point review systems negative unless listed above.      Current Outpatient Medications   Medication Sig Dispense Refill    aspirin (ASA) 81 MG EC tablet Take 81 mg by mouth.      DULoxetine (CYMBALTA) 30 MG capsule Take 1 capsule (30 mg) by mouth 2 times daily. 30 capsule 0    hydrOXYzine HCl (ATARAX) 25 MG tablet Take 1 tablet (25 mg) by mouth 3 times daily as needed (Anxiety or back pain). 30 tablet 0    LORazepam (ATIVAN) 0.5 MG tablet Take 1 tablet (0.5 mg) by mouth every 6 hours as needed for anxiety. 10 tablet 0    methocarbamol (ROBAXIN) 500 MG tablet       metoprolol succinate ER (TOPROL XL) 25 MG 24 hr tablet Take 1 tablet by mouth daily.      oxyCODONE (ROXICODONE) 5 MG tablet Take 1-2 tablets (5-10 mg) by mouth 3 times daily as needed for moderate to severe pain. 20 tablet 0    prasugrel (EFFIENT) 10 MG TABS tablet Take 1 tablet by mouth daily.      rosuvastatin (CRESTOR) 40 MG tablet       traMADol (ULTRAM) 50 MG tablet Take 1 tablet (50 mg) by mouth 3 times daily as needed for pain. 20 tablet 0    " zolpidem (AMBIEN) 10 MG tablet Take 1 tablet (10 mg) by mouth nightly as needed for sleep. 30 tablet 0     No current facility-administered medications for this visit.       Past Medical History:   Diagnosis Date    DDD (degenerative disc disease), lumbar 04/11/2023    Epidural hematoma (H) 03/20/2023       Family History   Problem Relation Age of Onset    Cerebrovascular Disease Mother     Thyroid Disease Mother     Coronary Artery Disease Father     Hypertension Father     Hyperlipidemia Father     Cerebrovascular Disease Father     Prostate Cancer Paternal Grandfather          Social History     Socioeconomic History    Marital status:      Spouse name: None    Number of children: None    Years of education: None    Highest education level: None   Tobacco Use    Smoking status: Never     Passive exposure: Never    Smokeless tobacco: Never   Vaping Use    Vaping status: Never Used   Substance and Sexual Activity    Alcohol use: Yes     Comment: 2-3 per week    Drug use: Never    Sexual activity: Yes     Partners: Female     Birth control/protection: I.U.D.   Other Topics Concern    Parent/sibling w/ CABG, MI or angioplasty before 65F 55M? No     Social Drivers of Health     Food Insecurity: No Food Insecurity (4/8/2025)    Received from [facility]     Hunger Vital Sign     Worried About Running Out of Food in the Last Year: Never true     Ran Out of Food in the Last Year: Never true   Transportation Needs: No Transportation Needs (4/8/2025)    Received from [facility]     [facility]     Lack of Transportation (Medical): No     Lack of Transportation (Non-Medical): No   Interpersonal Safety: Not At Risk (5/10/2025)    Received from [facility]     Humiliation, Afraid, Rape, and Kick questionnaire     Fear of Current or Ex-Partner: No     Emotionally Abused: No     Physically Abused: No     Sexually Abused: No   Housing Stability: Low Risk  (4/8/2025)    Received  "from [facility]     Housing Stability Vital Sign     Unable to Pay for Housing in the Last Year: No     Number of Times Moved in the Last Year: 0     Homeless in the Last Year: No     Social history: .  2 children.  No tobacco.  Does drink alcohol    The following portions of the patient's history were reviewed and updated as appropriate: allergies, current medications, past family history, past medical history, past social history, past surgical history and problem list.    Oswestry ([facility]) Questionnaire        5/12/2025     7:47 AM   OSWESTRY DISABILITY INDEX   Count 10    Sum 18    Oswestry Score (%) 36 %        Patient-reported       Neck Disability Index:      5/12/2025     7:48 AM   Neck Disability Index (  Prosper H. and Svetlana FUENTES. 1991. All rights reserved.; used with permission)   SECTION 1 - PAIN INTENSITY 0   SECTION 2 - PERSONAL CARE 0   SECTION 3 - LIFTING 0   SECTION 4 - READING 0   SECTION 5 - HEADACHES 0   SECTION 6 - CONCENTRATION 0   SECTION 7 - WORK 0   SECTION 8 - DRIVING 0   SECTION 9 - SLEEPING 0   SECTION 10 - RECREATION 0   Count 10    Sum 0    Raw Score: /50 0    Neck Disability Index Score: (%) 0 %        Patient-reported          PHQ-2 Score:         5/12/2025     7:40 AM 4/24/2025     7:01 PM   PHQ-2 ( 1999 [employer])   Q1: Little interest or pleasure in doing things 1 0   Q2: Feeling down, depressed or hopeless 1 1   PHQ-2 Score 2  1    Q1: Little interest or pleasure in doing things Several days Not at all   Q2: Feeling down, depressed or hopeless Several days Several days   PHQ-2 Score 2 1       Patient-reported                  Objective:   Physical Exam:    BP (!) 145/67   Pulse 76   Ht 6' 3.39\" (1.915 m)   Wt 211 lb (95.7 kg)   BMI 26.10 kg/m    Body mass index is 26.1 kg/m .      General:  Well-appearing male in no acute distress.  Pleasant, cooperative, and interactive throughout the examination and interview.  CV: No lower extremity edema on inspection or " paltation.  Lymphatics: No cervical lymphadenopathy palpated. Eyes: sclera clear. Skin: No rashes or lesions seen over the head/neck, hairline, arms, legs   Respirations unlabored.  MSK: Gait is nonantalgic.  Able to heel-toe walk without difficulty.  Negative Romberg.  Spine: normal AP curves of the C, T, and L spine.   Mildly reduced lumbar flexion finger to floor testing.  Palpation: No significant tenderness to palpation thoracic or lumbar spinous process or paraspinal tissues.  Extremities: Full range of motion of the elbows, and wrists with no effusions or tenderness to palpation.   Full range of motion of the hips, knees, and ankles with no effusions or tenderness to palpation.   No hypermobility of the upper or lower extremities.  Neurologic exam: Mental status: Patient is alert and oriented with normal affect.  Attention, knowledge, memory, and language are intact.  Normal coordination throughout the examination.  Reflexes are 2+ and symmetric biceps, triceps, brachioradialis, patellar, and Achilles with down-going toes and Negative Vincent's.  Sensation is intact to light touch throughout the upper and lower extremities bilaterally.  Manual muscle testing reveals 5 out of 5 in the hip flexors, knee flexors/extensors, ankle plantar flexors, ankle  dorsiflexors, and EHL.  Upper extremities: Grossly normal strength . Normal muscle bulk and tone in the arms and legs.    Negative seated  straight leg raise bilaterally.

## 2025-05-16 PROBLEM — G89.29 CHRONIC BILATERAL LOW BACK PAIN WITHOUT SCIATICA: Status: ACTIVE | Noted: 2025-05-16

## 2025-05-16 PROBLEM — M54.50 CHRONIC BILATERAL LOW BACK PAIN WITHOUT SCIATICA: Status: ACTIVE | Noted: 2025-05-16

## 2025-05-19 ENCOUNTER — LAB (OUTPATIENT)
Dept: LAB | Facility: CLINIC | Age: 45
End: 2025-05-19
Payer: COMMERCIAL

## 2025-05-19 DIAGNOSIS — E78.5 HYPERLIPIDEMIA LDL GOAL <70: Primary | ICD-10-CM

## 2025-05-19 DIAGNOSIS — R74.8 ELEVATED LIVER ENZYMES: ICD-10-CM

## 2025-05-19 DIAGNOSIS — I50.23 ACUTE ON CHRONIC SYSTOLIC CONGESTIVE HEART FAILURE (H): ICD-10-CM

## 2025-05-19 DIAGNOSIS — I25.119 CORONARY ARTERY DISEASE INVOLVING NATIVE CORONARY ARTERY OF NATIVE HEART WITH ANGINA PECTORIS: ICD-10-CM

## 2025-05-19 DIAGNOSIS — Z13.1 SCREENING FOR DIABETES MELLITUS: ICD-10-CM

## 2025-05-19 DIAGNOSIS — Z13.1 DIABETES MELLITUS SCREENING: ICD-10-CM

## 2025-05-19 DIAGNOSIS — E05.90 HYPERTHYROIDISM: ICD-10-CM

## 2025-05-19 LAB
ALT SERPL W P-5'-P-CCNC: 73 U/L (ref 0–70)
ANION GAP SERPL CALCULATED.3IONS-SCNC: 14 MMOL/L (ref 7–15)
APO A-I SERPL-MCNC: 99 MG/DL
BUN SERPL-MCNC: 18.1 MG/DL (ref 6–20)
CALCIUM SERPL-MCNC: 10.4 MG/DL (ref 8.8–10.4)
CHLORIDE SERPL-SCNC: 103 MMOL/L (ref 98–107)
CHOLEST SERPL-MCNC: 104 MG/DL
CREAT SERPL-MCNC: 0.75 MG/DL (ref 0.67–1.17)
EGFRCR SERPLBLD CKD-EPI 2021: >90 ML/MIN/1.73M2
ERYTHROCYTE [DISTWIDTH] IN BLOOD BY AUTOMATED COUNT: 11.7 % (ref 10–15)
FASTING STATUS PATIENT QL REPORTED: YES
GLUCOSE SERPL-MCNC: 108 MG/DL (ref 70–99)
GLUCOSE SERPL-MCNC: 108 MG/DL (ref 70–99)
HCO3 SERPL-SCNC: 23 MMOL/L (ref 22–29)
HCT VFR BLD AUTO: 45.3 % (ref 40–53)
HCYS SERPL-SCNC: 9.5 UMOL/L (ref 0–15)
HDLC SERPL-MCNC: 35 MG/DL
HGB BLD-MCNC: 15.6 G/DL (ref 13.3–17.7)
LDLC SERPL CALC-MCNC: 45 MG/DL
MCH RBC QN AUTO: 28.7 PG (ref 26.5–33)
MCHC RBC AUTO-ENTMCNC: 34.4 G/DL (ref 31.5–36.5)
MCV RBC AUTO: 83 FL (ref 78–100)
NONHDLC SERPL-MCNC: 69 MG/DL
PLATELET # BLD AUTO: 256 10E3/UL (ref 150–450)
POTASSIUM SERPL-SCNC: 3.8 MMOL/L (ref 3.4–5.3)
RBC # BLD AUTO: 5.43 10E6/UL (ref 4.4–5.9)
SODIUM SERPL-SCNC: 140 MMOL/L (ref 135–145)
T4 FREE SERPL-MCNC: 3.65 NG/DL (ref 0.9–1.7)
TRIGL SERPL-MCNC: 120 MG/DL
TSH SERPL DL<=0.005 MIU/L-ACNC: <0.01 UIU/ML (ref 0.3–4.2)
WBC # BLD AUTO: 4.4 10E3/UL (ref 4–11)

## 2025-05-19 PROCEDURE — 83036 HEMOGLOBIN GLYCOSYLATED A1C: CPT

## 2025-05-19 PROCEDURE — 84443 ASSAY THYROID STIM HORMONE: CPT

## 2025-05-19 PROCEDURE — 84481 FREE ASSAY (FT-3): CPT

## 2025-05-19 PROCEDURE — 80053 COMPREHEN METABOLIC PANEL: CPT

## 2025-05-19 PROCEDURE — 85027 COMPLETE CBC AUTOMATED: CPT

## 2025-05-19 PROCEDURE — 83695 ASSAY OF LIPOPROTEIN(A): CPT

## 2025-05-19 PROCEDURE — 83090 ASSAY OF HOMOCYSTEINE: CPT

## 2025-05-19 PROCEDURE — 80061 LIPID PANEL: CPT

## 2025-05-19 PROCEDURE — 82248 BILIRUBIN DIRECT: CPT

## 2025-05-19 PROCEDURE — 36415 COLL VENOUS BLD VENIPUNCTURE: CPT

## 2025-05-19 PROCEDURE — 84439 ASSAY OF FREE THYROXINE: CPT

## 2025-05-20 ENCOUNTER — RESULTS FOLLOW-UP (OUTPATIENT)
Dept: FAMILY MEDICINE | Facility: CLINIC | Age: 45
End: 2025-05-20

## 2025-05-20 DIAGNOSIS — Z13.1 DIABETES MELLITUS SCREENING: ICD-10-CM

## 2025-05-20 DIAGNOSIS — E05.90 HYPERTHYROIDISM: Primary | ICD-10-CM

## 2025-05-20 DIAGNOSIS — R74.8 ELEVATED LIVER ENZYMES: ICD-10-CM

## 2025-05-20 LAB
T3 SERPL-MCNC: 315 NG/DL (ref 85–202)
T3FREE SERPL-MCNC: 11.8 PG/ML (ref 2–4.4)

## 2025-05-20 NOTE — LETTER
"May 21, 2025      Max A [place]  [address]  [place] [address]        Hi Max,     Okay, so the liver function tests were fine. Not worried about a mildly elevated ALT because and guessing it might be related to the thyroid issue. Certainly something we always keep an eye on. Your hemoglobin A1c is a long-term measure of sugar and it is at 6.0 which is on the borderline of \"prediabetes.\" Again, this may be related to some of the other issues going on. So now we will start following this as we work on the thyroid issue. My guess is it will come back down to normal. And certainly just keep up with normal exercise, healthy diet, etc. I know you are already doing these things. So I think it may be related to the hyperthyroidism and that lab work should be coming back soon.     Resulted Orders   Lipid panel reflex to direct LDL Fasting   Result Value Ref Range    Cholesterol 104 <200 mg/dL    Triglycerides 120 <150 mg/dL    Direct Measure HDL 35 (L) >=40 mg/dL    LDL Cholesterol Calculated 45 <100 mg/dL    Non HDL Cholesterol 69 <130 mg/dL    Patient Fasting > 8hrs? Yes     Narrative    Cholesterol  Desirable: < 200 mg/dL  Borderline High: 200 - 239 mg/dL  High: >= 240 mg/dL    Triglycerides  Normal: < 150 mg/dL  Borderline High: 150 - 199 mg/dL  High: 200-499 mg/dL  Very High: >= 500 mg/dL    Direct Measure HDL  Female: >= 50 mg/dL   Male: >= 40 mg/dL    LDL Cholesterol  Desirable: < 100 mg/dL  Above Desirable: 100 - 129 mg/dL   Borderline High: 130 - 159 mg/dL   High:  160 - 189 mg/dL   Very High: >= 190 mg/dL    Non HDL Cholesterol  Desirable: < 130 mg/dL  Above Desirable: 130 - 159 mg/dL  Borderline High: 160 - 189 mg/dL  High: 190 - 219 mg/dL  Very High: >= 220 mg/dL   Lipoprotein (a)   Result Value Ref Range    Lipoprotein (a) 99 (H) <30 mg/dL   Homocysteine   Result Value Ref Range    Homocysteine 9.5 0.0 - 15.0 umol/L      Comment:      A normal plasma homocysteine level likely rules out genetic " defects in homocysteine metabolism.   ALT   Result Value Ref Range    ALT 73 (H) 0 - 70 U/L   BASIC METABOLIC PANEL   Result Value Ref Range    Sodium 140 135 - 145 mmol/L    Potassium 3.8 3.4 - 5.3 mmol/L    Chloride 103 98 - 107 mmol/L    Carbon Dioxide (CO2) 23 22 - 29 mmol/L    Anion Gap 14 7 - 15 mmol/L    Urea Nitrogen 18.1 6.0 - 20.0 mg/dL    Creatinine 0.75 0.67 - 1.17 mg/dL    GFR Estimate >90 >60 mL/min/1.73m2      Comment:      eGFR calculated using 2021 CKD-EPI equation.    Calcium 10.4 8.8 - 10.4 mg/dL    Glucose 108 (H) 70 - 99 mg/dL    Patient Fasting > 8hrs? Yes    TSH WITH FREE T4 REFLEX   Result Value Ref Range    TSH <0.01 (L) 0.30 - 4.20 uIU/mL   CBC with Platelets   Result Value Ref Range    WBC Count 4.4 4.0 - 11.0 10e3/uL    RBC Count 5.43 4.40 - 5.90 10e6/uL    Hemoglobin 15.6 13.3 - 17.7 g/dL    Hematocrit 45.3 40.0 - 53.0 %    MCV 83 78 - 100 fL    MCH 28.7 26.5 - 33.0 pg    MCHC 34.4 31.5 - 36.5 g/dL    RDW 11.7 10.0 - 15.0 %    Platelet Count 256 150 - 450 10e3/uL   Glucose   Result Value Ref Range    Glucose 108 (H) 70 - 99 mg/dL    Patient Fasting > 8hrs? Yes    T4 free   Result Value Ref Range    Free T4 3.65 (H) 0.90 - 1.70 ng/dL   T3, total   Result Value Ref Range    T3 Total 315 (H) 85 - 202 ng/dL   T3, Free   Result Value Ref Range    T3 Free 11.8 (H) 2.0 - 4.4 pg/mL   Hepatic function panel   Result Value Ref Range    Protein Total 7.0 6.4 - 8.3 g/dL    Albumin 4.3 3.5 - 5.2 g/dL    Bilirubin Total 0.7 <=1.2 mg/dL    Alkaline Phosphatase 135 40 - 150 U/L    AST 36 0 - 45 U/L    ALT 75 (H) 0 - 70 U/L    Bilirubin Direct 0.29 0.00 - 0.45 mg/dL      Comment:      As of 5/14/25, reference ranges and trending lines may vary depending on the testing location.   Hemoglobin A1c   Result Value Ref Range    Estimated Average Glucose 126 (H) <117 mg/dL    Hemoglobin A1C 6.0 (H) 0.0 - 5.6 %      Comment:      Normal <5.7%   Prediabetes 5.7-6.4%    Diabetes 6.5% or higher     Note: Adopted  from ADA consensus guidelines.       If you have any questions or concerns, please call the clinic at the number listed above.       Sincerely,      Beena Samuel MD    Electronically signed

## 2025-05-20 NOTE — RESULT ENCOUNTER NOTE
Max,  Well, a few interesting findings on the lab work.  Your lipoprotein a was indeed elevated.  I know you and I were talking about this as the risk factor for the heart disease and it looks to be involved.  The cardiology folks will certainly know what this means from a clinical standpoint, etc.    But I think more interesting is the fact that you are TSH is extremely low and your free T4 is elevated.  This indicates hyperthyroidism and I suspect you may have Graves' disease.  If you look this up online at for example [facility] I think we may find some of the other symptoms about feeling off that you have noted.  Anxiety and agitation and things like that.  We need to run some additional testing to confirm that this is what is going on.  So I am going to set up some additional lab work to look at thyroid antibodies.  And if present then we can figure out how to treat this.  There are couple of options including suppressing with the medication and even looking at a radioactive iodine ablation.  But lets run this additional testing first.  Can be done at any [facility] lab that works for you.  No need to be fasting.    STEVEN Samuel M.D.

## 2025-05-21 ENCOUNTER — LAB (OUTPATIENT)
Dept: LAB | Facility: CLINIC | Age: 45
End: 2025-05-21
Payer: COMMERCIAL

## 2025-05-21 DIAGNOSIS — E05.90 HYPERTHYROIDISM: ICD-10-CM

## 2025-05-21 LAB
ALBUMIN SERPL BCG-MCNC: 4.3 G/DL (ref 3.5–5.2)
ALP SERPL-CCNC: 135 U/L (ref 40–150)
ALT SERPL W P-5'-P-CCNC: 75 U/L (ref 0–70)
AST SERPL W P-5'-P-CCNC: 36 U/L (ref 0–45)
BILIRUB SERPL-MCNC: 0.7 MG/DL
BILIRUBIN DIRECT (ROCHE PRO & PURE): 0.29 MG/DL (ref 0–0.45)
EST. AVERAGE GLUCOSE BLD GHB EST-MCNC: 126 MG/DL
HBA1C MFR BLD: 6 % (ref 0–5.6)
PROT SERPL-MCNC: 7 G/DL (ref 6.4–8.3)

## 2025-05-21 PROCEDURE — 86376 MICROSOMAL ANTIBODY EACH: CPT

## 2025-05-21 PROCEDURE — 83520 IMMUNOASSAY QUANT NOS NONAB: CPT | Mod: 90

## 2025-05-21 PROCEDURE — 36415 COLL VENOUS BLD VENIPUNCTURE: CPT

## 2025-05-21 PROCEDURE — 99000 SPECIMEN HANDLING OFFICE-LAB: CPT

## 2025-05-21 NOTE — RESULT ENCOUNTER NOTE
"Okay, so the liver function tests were fine.  Not worried about a mildly elevated ALT because and guessing it might be related to the thyroid issue.  Certainly something we always keep an eye on.  Your hemoglobin A1c is a long-term measure of sugar and it is at 6.0 which is on the borderline of \"prediabetes.\"  Again, this may be related to some of the other issues going on.  So now we will start following this as we work on the thyroid issue.  My guess is it will come back down to normal.  And certainly just keep up with normal exercise, healthy diet, etc.  I know you are already doing these things.  So I think it may be related to the hyperthyroidism and that lab work should be coming back soon.  STEVEN Samuel M.D.   "

## 2025-05-22 ENCOUNTER — THERAPY VISIT (OUTPATIENT)
Dept: PHYSICAL THERAPY | Facility: CLINIC | Age: 45
End: 2025-05-22
Payer: COMMERCIAL

## 2025-05-22 DIAGNOSIS — M54.50 CHRONIC BILATERAL LOW BACK PAIN WITHOUT SCIATICA: Primary | ICD-10-CM

## 2025-05-22 DIAGNOSIS — G89.29 CHRONIC BILATERAL LOW BACK PAIN WITHOUT SCIATICA: Primary | ICD-10-CM

## 2025-05-22 LAB — THYROPEROXIDASE AB SERPL-ACNC: 330 IU/ML

## 2025-05-26 ENCOUNTER — PATIENT OUTREACH (OUTPATIENT)
Dept: CARE COORDINATION | Facility: CLINIC | Age: 45
End: 2025-05-26
Payer: COMMERCIAL

## 2025-05-28 ENCOUNTER — TELEPHONE (OUTPATIENT)
Dept: ENDOCRINOLOGY | Facility: CLINIC | Age: 45
End: 2025-05-28
Payer: COMMERCIAL

## 2025-05-28 NOTE — TELEPHONE ENCOUNTER
Left Voicemail (1st Attempt) for the patient to call back and schedule the following:    Appointment type: New Endocrine   Provider: Any provider that sees pt's diagnosis   Return date: within 1 week if possible   Specialty phone number: [phone]  Additional appointment(s) needed:   Additonal Notes: LVM, MyC x1   Inessa June, RN  [id number]  Received Endocrine Referral as follows:  E05.00 (ICD-10-CM) - Graves disease    Per Endocrine RN Triage Scheduling Protocol patient should be seen urgently in urgent on call or TAURUS spot within 1 week.    Examples(virtual visits preferred due to urgency, if pt will only do in person jennifer and send TE so we can inform nurses):  6/3 [facility]o virtual csc  6/4 [facility]o virtual csc  6/10 Ruanpeng virtual csc  6/11 Ruanpeng virtual csc  7/23 Radulescu in person mg   8/6 Radulescu in person mg     Please note that the above appointment(s) will require manual scheduling as they are marked as TAURUS and will not appear using auto search. Do not schedule the patient if another patient has already been scheduled in the requested appointment slot.     Corina Orlando on 5/28/2025 at 11:08 AM

## 2025-05-29 ENCOUNTER — RESULTS FOLLOW-UP (OUTPATIENT)
Dept: PHYSICAL MEDICINE AND REHAB | Facility: CLINIC | Age: 45
End: 2025-05-29

## 2025-06-04 ENCOUNTER — VIRTUAL VISIT (OUTPATIENT)
Dept: ENDOCRINOLOGY | Facility: CLINIC | Age: 45
End: 2025-06-04
Attending: FAMILY MEDICINE
Payer: COMMERCIAL

## 2025-06-04 DIAGNOSIS — E05.00 GRAVES DISEASE: Primary | ICD-10-CM

## 2025-06-04 DIAGNOSIS — E05.90 HYPERTHYROIDISM: ICD-10-CM

## 2025-06-04 RX ORDER — METHIMAZOLE 5 MG/1
15 TABLET ORAL DAILY
Qty: 90 TABLET | Refills: 1 | Status: SHIPPED | OUTPATIENT
Start: 2025-06-04

## 2025-06-04 NOTE — PATIENT INSTRUCTIONS
-Increase methimazole to 15 mg (3 x 5 mg tablets) once daily  -Lab draw in 3 weeks (around 6/25/2025)  -Based on results, we will adjust methimazole dose as needed  -Return for a follow-up visit in person in [facility] available appointment, with labs before visit  -We will communicate results via [employer] App, or if needed by phone

## 2025-06-04 NOTE — PROGRESS NOTES
ENDOCRINOLOGY VIDEO VISIT NEW        HISTORY OF PRESENT ILLNESS    Itz Jurado (Boris) is a 44 year old male who is being evaluated via a billable video visit. The patient is seen in consultation at the request of Beena Samuel MD for Graves' disease.    This is a visit in the urgent access endocrinology clinic.    The patient was noted to have abnormal thyroid function tests in 5/2025, after he had labs drawn at primary care clinic for posthospitalization follow-up.  The patient was hospitalized in 4/2025 with acute coronary syndrome, as detailed below.    Dr. Samuel performed additional testing after initial abnormal thyroid function tests and, once TSH receptor antibody returned elevated, recommended starting treatment.  The patient was initiated on methimazole on 5/23/2025 at a dose of 5 mg p.o. daily.    Boris has been taking methimazole 5 mg daily since around 5/23/2025.  He has been tolerating the medication without side effect.    Prior to diagnosis of hyperthyroidism, the patient had been noting increasing resting heart rate (his wife had also noted increased work of breathing prior to his hospitalization with acute coronary syndrome).  He has longstanding heat intolerance and had noticed more fatigue and tremulousness in the preceding months.  No significant change in bowel habits (has longstanding history of frequent bowel movements a few times a day).    He has had longstanding light sensitivity in his eyes.  Otherwise, no eye discomfort, injection, or vision changes.    He has not noted a change in the feel of his neck.  Post hospitalization in 4/2025, he has noticed that his voice is not as loud as before.  Otherwise, no dysphagia, consistent hoarseness or changes in breathing/stridor.    He does not take over-the-counter hair skin and nail supplement, biotin or iodine.  He did have cardiac catheterization and coronary angiography in 4/2025.    Family history is notable for mother with  hypothyroidism.  No family history of diabetes in immediate family members.    The patient's medical history is notable for CHF due to ischemic cardiomyopathy: He had NSTEMI, status post PCI to the LAD in 4/2025.  He was discovered to have hyperlipidemia during hospitalization in 4/2025 and initiated on statin therapy.    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   with 2 children.    PAST MEDICAL HISTORY  Past Medical History:   Diagnosis Date    Coronary artery disease April 8, 2025    DDD (degenerative disc disease), lumbar 04/11/2023    Epidural hematoma (H) 03/20/2023    Hypertension April 8, 2025    Thyroid disease May 23, 2025       MEDICATIONS  Current Outpatient Medications   Medication Sig Dispense Refill    methimazole (TAPAZOLE) 5 MG tablet Take 3 tablets (15 mg) by mouth daily. 90 tablet 1    aspirin (ASA) 81 MG EC tablet Take 81 mg by mouth.      DULoxetine (CYMBALTA) 30 MG capsule Take 1 capsule (30 mg) by mouth 2 times daily. 30 capsule 0    hydrOXYzine HCl (ATARAX) 25 MG tablet Take 1 tablet (25 mg) by mouth 3 times daily as needed (Anxiety or back pain). 30 tablet 0    LORazepam (ATIVAN) 0.5 MG tablet Take 1 tablet (0.5 mg) by mouth every 6 hours as needed for anxiety. 10 tablet 0    methocarbamol (ROBAXIN) 500 MG tablet       metoprolol succinate ER (TOPROL XL) 25 MG 24 hr tablet Take 1 tablet by mouth daily.      oxyCODONE (ROXICODONE) 5 MG tablet Take 1-2 tablets (5-10 mg) by mouth 3 times daily as needed for moderate to severe pain. 20 tablet 0    prasugrel (EFFIENT) 10 MG TABS tablet Take 1 tablet by mouth daily.      rosuvastatin (CRESTOR) 40 MG tablet       traMADol (ULTRAM) 50 MG tablet Take 1 tablet (50 mg) by mouth 3 times daily as needed for pain. 20 tablet 0    zolpidem (AMBIEN) 10 MG tablet Take 1 tablet (10 mg) by mouth nightly as needed for sleep. 30 tablet 0       Allergies, family, and social history were  reviewed and documented as needed in EHR.     REVIEW OF SYSTEMS  A focused ROS was performed, with pertinent positives and negatives as noted in the HPI.     PHYSICAL EXAM  There were no vitals taken for this visit.  There is no height or weight on file to calculate BMI.  Constitutional: Patient is alert, oriented and appears in no acute distress.  Eyes: Eyes grossly normal to inspection, EOMI, no stare, lid lag, or retraction; no conjunctival injection.  ENMT: Lips are without lesions.   Neck: No visible goiter or neck mass.  Respiratory: No audible wheeze or cough. No visible cyanosis. No visible increased work of breathing.  Neurological: Alert and oriented times 3.  Cranial nerves grossly intact.      DATA REVIEW  Each of the following laboratory and/or imaging studies were reviewed.    Component      Latest Ref Rng 5/19/2025  10:27 AM 5/21/2025  2:17 PM   Sodium      135 - 145 mmol/L 140     Potassium      3.4 - 5.3 mmol/L 3.8     Chloride      98 - 107 mmol/L 103     Carbon Dioxide (CO2)      22 - 29 mmol/L 23     Anion Gap      7 - 15 mmol/L 14     Urea Nitrogen      6.0 - 20.0 mg/dL 18.1     Creatinine      0.67 - 1.17 mg/dL 0.75     GFR Estimate      >60 mL/min/1.73m2 >90     Calcium      8.8 - 10.4 mg/dL 10.4     Glucose      70 - 99 mg/dL 108 (H)     Glucose      70 - 99 mg/dL 108 (H)     Patient Fasting? Yes     Patient Fasting? Yes     Patient Fasting? Yes     WBC      4.0 - 11.0 10e3/uL 4.4     RBC Count      4.40 - 5.90 10e6/uL 5.43     Hemoglobin      13.3 - 17.7 g/dL 15.6     Hematocrit      40.0 - 53.0 % 45.3     MCV      78 - 100 fL 83     MCH      26.5 - 33.0 pg 28.7     MCHC      31.5 - 36.5 g/dL 34.4     RDW      10.0 - 15.0 % 11.7     Platelet Count      150 - 450 10e3/uL 256     Protein Total      6.4 - 8.3 g/dL 7.0     Albumin      3.5 - 5.2 g/dL 4.3     Bilirubin Total      <=1.2 mg/dL 0.7     Alkaline Phosphatase      40 - 150 U/L 135     AST      0 - 45 U/L 36     ALT      0 - 70 U/L 73  (H)     ALT      0 - 70 U/L 75 (H)     Bilirubin Direct      0.00 - 0.45 mg/dL 0.29     Cholesterol      <200 mg/dL 104     Triglycerides      <150 mg/dL 120     HDL Cholesterol      >=40 mg/dL 35 (L)     LDL Cholesterol Calculated      <100 mg/dL 45     Non HDL Cholesterol      <130 mg/dL 69     Estimated Average Glucose      <117 mg/dL 126 (H)     Hemoglobin A1C      0.0 - 5.6 % 6.0 (H)     Lipoprotein (a)      <30 mg/dL 99 (H)     Homocysteine umol/L      0.0 - 15.0 umol/L 9.5     TSH      0.30 - 4.20 uIU/mL <0.01 (L)     T4 Free      0.90 - 1.70 ng/dL 3.65 (H)     Triiodothyronine (T3)      85 - 202 ng/dL 315 (H)     Free T3      2.0 - 4.4 pg/mL 11.8 (H)     Thyrotropin Receptor Antibody      0.00 - 1.75 IU/L  3.41 (H)    Thyroid Peroxidase Antibody      <35 IU/mL  330 (H)       Legend:  (H) High  (L) Low      ASSESSMENT  1.  Hyperthyroidism.  Due to Graves' disease based on positive TSH receptor antibody and family history.  Already on methimazole: Would increase dose slightly upward and reevaluate thyroid function tests in 3 weeks.  Once euthyroid, we can discuss patient's preferences for longer-term management of thyroid disease (whether antithyroid drug, radioiodine therapy or surgery).    We reviewed pathophysiology of Graves' disease, its manifestations, as well as treatment options in detail.  We reviewed benefits and risks associated with methimazole, including common side effects such as rash and myalgias and rarer side effects such as agranulocytosis and hepatitis. We discussed precautions while taking methimazole, including the need to stop the medication and present urgently for blood count if he develops severe sore throat, fevers, and flu-like symptoms. I note normal CBC in 5/2025, mild transaminitis potentially due to thyroid disease.  We will recheck with next lab draw.    2.  Hyperglycemia.  Hemoglobin A1c in the range of prediabetes/high risk for diabetes.  Potential contributors to  hyperglycemia include thyrotoxicosis and recent acute stress with acute coronary syndrome.  Reevaluate hemoglobin A1c in the coming few months; if progressing/worsening, consider evaluating GUILLE antibody and C-peptide.    PLAN  -Increase methimazole to 15 mg (3 x 5 mg tablets) once daily  -Lab draw in 3 weeks (around 6/25/2025)  -Based on results, we will adjust methimazole dose as needed  -Return for a follow-up visit in person in [facility] available appointment, with labs before visit  -We will communicate results via [email or web address], or if needed by phone      Orders Placed This Encounter   Procedures    Hepatic function panel    Thyroid stimulating immunoglobulin    TSH    T4 free    T3 total         Video-Visit Details    Type of service:  Video Visit    Physician location: On site    Video Start Time: 10 AM  Video End Time: 10:25 AM    Platform used for Video Visit: Rajesh GALEAS spent a total of 58 minutes on the date of encounter reviewing medical records, evaluating the patient, coordinating care and documenting in the EHR, as detailed above.      Davonte Armendariz MD   Division of Diabetes, Endocrinology and Metabolism  Department of Medicine      cc: Beena Samuel MD

## 2025-06-04 NOTE — NURSING NOTE
Current patient location: [address] [place] [address] [address]  [facility] [address]    Is the patient currently in the state of [place]? YES    Visit mode: VIDEO    If the visit is dropped, the patient can be reconnected by:VIDEO VISIT: Text to cell phone:   Telephone Information:   Mobile [phone]       Will anyone else be joining the visit? NO  (If patient encounters technical issues they should call [phone]    Are changes needed to the allergy or medication list? No and Pt stated no med changes    Are refills needed on medications prescribed by this physician? NO    Rooming Documentation:  Questionnaire(s) completed    Reason for visit: Consult    Yohana ALVES

## 2025-06-04 NOTE — LETTER
6/4/2025       RE: Itz Jurado  [address]  [facility] [address]     Dear Colleague,    Thank you for referring your patient, Itz Jurado, to the [facility] [place] at [facility]. Please see a copy of my visit note below.      ENDOCRINOLOGY VIDEO VISIT NEW        HISTORY OF PRESENT ILLNESS    Itz Jurado (Boris) is a 44 year old male who is being evaluated via a billable video visit. The patient is seen in consultation at the request of Beena Samuel MD for Graves' disease.    This is a visit in the urgent access endocrinology clinic.    The patient was noted to have abnormal thyroid function tests in 5/2025, after he had labs drawn at primary care clinic for posthospitalization follow-up.  The patient was hospitalized in 4/2025 with acute coronary syndrome, as detailed below.    Dr. Samuel performed additional testing after initial abnormal thyroid function tests and, once TSH receptor antibody returned elevated, recommended starting treatment.  The patient was initiated on methimazole on 5/23/2025 at a dose of 5 mg p.o. daily.    Boris has been taking methimazole 5 mg daily since around 5/23/2025.  He has been tolerating the medication without side effect.    Prior to diagnosis of hyperthyroidism, the patient had been noting increasing resting heart rate (his wife had also noted increased work of breathing prior to his hospitalization with acute coronary syndrome).  He has longstanding heat intolerance and had noticed more fatigue and tremulousness in the preceding months.  No significant change in bowel habits (has longstanding history of frequent bowel movements a few times a day).    He has had longstanding light sensitivity in his eyes.  Otherwise, no eye discomfort, injection, or vision changes.    He has not noted a change in the feel of his neck.  Post hospitalization in 4/2025, he has noticed that  his voice is not as loud as before.  Otherwise, no dysphagia, consistent hoarseness or changes in breathing/stridor.    He does not take over-the-counter hair skin and nail supplement, biotin or iodine.  He did have cardiac catheterization and coronary angiography in 4/2025.    Family history is notable for mother with hypothyroidism.  No family history of diabetes in immediate family members.    The patient's medical history is notable for CHF due to ischemic cardiomyopathy: He had NSTEMI, status post PCI to the LAD in 4/2025.  He was discovered to have hyperlipidemia during hospitalization in 4/2025 and initiated on statin therapy.    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   with 2 children.    PAST MEDICAL HISTORY  Past Medical History:   Diagnosis Date     Coronary artery disease April 8, 2025     DDD (degenerative disc disease), lumbar 04/11/2023     Epidural hematoma (H) 03/20/2023     Hypertension April 8, 2025     Thyroid disease May 23, 2025       MEDICATIONS  Current Outpatient Medications   Medication Sig Dispense Refill     methimazole (TAPAZOLE) 5 MG tablet Take 3 tablets (15 mg) by mouth daily. 90 tablet 1     aspirin (ASA) 81 MG EC tablet Take 81 mg by mouth.       DULoxetine (CYMBALTA) 30 MG capsule Take 1 capsule (30 mg) by mouth 2 times daily. 30 capsule 0     hydrOXYzine HCl (ATARAX) 25 MG tablet Take 1 tablet (25 mg) by mouth 3 times daily as needed (Anxiety or back pain). 30 tablet 0     LORazepam (ATIVAN) 0.5 MG tablet Take 1 tablet (0.5 mg) by mouth every 6 hours as needed for anxiety. 10 tablet 0     methocarbamol (ROBAXIN) 500 MG tablet        metoprolol succinate ER (TOPROL XL) 25 MG 24 hr tablet Take 1 tablet by mouth daily.       oxyCODONE (ROXICODONE) 5 MG tablet Take 1-2 tablets (5-10 mg) by mouth 3 times daily as needed for moderate to severe pain. 20 tablet 0     prasugrel (EFFIENT) 10 MG TABS tablet Take 1  tablet by mouth daily.       rosuvastatin (CRESTOR) 40 MG tablet        traMADol (ULTRAM) 50 MG tablet Take 1 tablet (50 mg) by mouth 3 times daily as needed for pain. 20 tablet 0     zolpidem (AMBIEN) 10 MG tablet Take 1 tablet (10 mg) by mouth nightly as needed for sleep. 30 tablet 0       Allergies, family, and social history were reviewed and documented as needed in EHR.     REVIEW OF SYSTEMS  A focused ROS was performed, with pertinent positives and negatives as noted in the HPI.     PHYSICAL EXAM  There were no vitals taken for this visit.  There is no height or weight on file to calculate BMI.  Constitutional: Patient is alert, oriented and appears in no acute distress.  Eyes: Eyes grossly normal to inspection, EOMI, no stare, lid lag, or retraction; no conjunctival injection.  ENMT: Lips are without lesions.   Neck: No visible goiter or neck mass.  Respiratory: No audible wheeze or cough. No visible cyanosis. No visible increased work of breathing.  Neurological: Alert and oriented times 3.  Cranial nerves grossly intact.      DATA REVIEW  Each of the following laboratory and/or imaging studies were reviewed.    Component      Latest Ref Rng 5/19/2025  10:27 AM 5/21/2025  2:17 PM   Sodium      135 - 145 mmol/L 140     Potassium      3.4 - 5.3 mmol/L 3.8     Chloride      98 - 107 mmol/L 103     Carbon Dioxide (CO2)      22 - 29 mmol/L 23     Anion Gap      7 - 15 mmol/L 14     Urea Nitrogen      6.0 - 20.0 mg/dL 18.1     Creatinine      0.67 - 1.17 mg/dL 0.75     GFR Estimate      >60 mL/min/1.73m2 >90     Calcium      8.8 - 10.4 mg/dL 10.4     Glucose      70 - 99 mg/dL 108 (H)     Glucose      70 - 99 mg/dL 108 (H)     Patient Fasting? Yes     Patient Fasting? Yes     Patient Fasting? Yes     WBC      4.0 - 11.0 10e3/uL 4.4     RBC Count      4.40 - 5.90 10e6/uL 5.43     Hemoglobin      13.3 - 17.7 g/dL 15.6     Hematocrit      40.0 - 53.0 % 45.3     MCV      78 - 100 fL 83     MCH      26.5 - 33.0 pg 28.7      MCHC      31.5 - 36.5 g/dL 34.4     RDW      10.0 - 15.0 % 11.7     Platelet Count      150 - 450 10e3/uL 256     Protein Total      6.4 - 8.3 g/dL 7.0     Albumin      3.5 - 5.2 g/dL 4.3     Bilirubin Total      <=1.2 mg/dL 0.7     Alkaline Phosphatase      40 - 150 U/L 135     AST      0 - 45 U/L 36     ALT      0 - 70 U/L 73 (H)     ALT      0 - 70 U/L 75 (H)     Bilirubin Direct      0.00 - 0.45 mg/dL 0.29     Cholesterol      <200 mg/dL 104     Triglycerides      <150 mg/dL 120     HDL Cholesterol      >=40 mg/dL 35 (L)     LDL Cholesterol Calculated      <100 mg/dL 45     Non HDL Cholesterol      <130 mg/dL 69     Estimated Average Glucose      <117 mg/dL 126 (H)     Hemoglobin A1C      0.0 - 5.6 % 6.0 (H)     Lipoprotein (a)      <30 mg/dL 99 (H)     Homocysteine umol/L      0.0 - 15.0 umol/L 9.5     TSH      0.30 - 4.20 uIU/mL <0.01 (L)     T4 Free      0.90 - 1.70 ng/dL 3.65 (H)     Triiodothyronine (T3)      85 - 202 ng/dL 315 (H)     Free T3      2.0 - 4.4 pg/mL 11.8 (H)     Thyrotropin Receptor Antibody      0.00 - 1.75 IU/L  3.41 (H)    Thyroid Peroxidase Antibody      <35 IU/mL  330 (H)       Legend:  (H) High  (L) Low      ASSESSMENT  1.  Hyperthyroidism.  Due to Graves' disease based on positive TSH receptor antibody and family history.  Already on methimazole: Would increase dose slightly upward and reevaluate thyroid function tests in 3 weeks.  Once euthyroid, we can discuss patient's preferences for longer-term management of thyroid disease (whether antithyroid drug, radioiodine therapy or surgery).    We reviewed pathophysiology of Graves' disease, its manifestations, as well as treatment options in detail.  We reviewed benefits and risks associated with methimazole, including common side effects such as rash and myalgias and rarer side effects such as agranulocytosis and hepatitis. We discussed precautions while taking methimazole, including the need to stop the medication and present  urgently for blood count if he develops severe sore throat, fevers, and flu-like symptoms. I note normal CBC in 5/2025, mild transaminitis potentially due to thyroid disease.  We will recheck with next lab draw.    2.  Hyperglycemia.  Hemoglobin A1c in the range of prediabetes/high risk for diabetes.  Potential contributors to hyperglycemia include thyrotoxicosis and recent acute stress with acute coronary syndrome.  Reevaluate hemoglobin A1c in the coming few months; if progressing/worsening, consider evaluating GUILLE antibody and C-peptide.    PLAN  -Increase methimazole to 15 mg (3 x 5 mg tablets) once daily  -Lab draw in 3 weeks (around 6/25/2025)  -Based on results, we will adjust methimazole dose as needed  -Return for a follow-up visit in person in [facility] available appointment, with labs before visit  -We will communicate results via [email or web address], or if needed by phone      Orders Placed This Encounter   Procedures     Hepatic function panel     Thyroid stimulating immunoglobulin     TSH     T4 free     T3 total         Video-Visit Details    Type of service:  Video Visit    Physician location: On site    Video Start Time: 10 AM  Video End Time: 10:25 AM    Platform used for Video Visit: Rajesh      I spent a total of 58 minutes on the date of encounter reviewing medical records, evaluating the patient, coordinating care and documenting in the EHR, as detailed above.      Davonte Armendariz MD   Division of Diabetes, Endocrinology and Metabolism  Department of Medicine      cc: Beena Samuel MD        Again, thank you for allowing me to participate in the care of your patient.      Sincerely,    Davonte Armendariz MD

## 2025-06-04 NOTE — PROGRESS NOTES
Assessment/Plan:      Boris was seen today for back pain.    Diagnoses and all orders for this visit:    Lumbar spine pain    Lumbar facet arthropathy    History of lumbar fusion    Myofascial pain    Thoracic spine pain    Closed fracture of eighth thoracic vertebra, unspecified fracture morphology, sequela         Assessment: Pleasant 44 year old male with history of coronary artery disease with recent stent placement, hypertension, hyperlipidemia, status post L4-5 fusion and T8 fracture with:     1.  Chronic lumbar spine pain with progressive approximate 5-month history of painful lumbosacral junction mid lumbar spine with intermittent lower extremity radicular pain.   History of L4-5 fusion.  Mild degenerative changes at L4-5 above the fusion with annular tear and facet arthropathy.  No high-grade central stenosis through the lumbar spine.  Overall doing fairly well with physical therapy exercises and being more active.     2.  Thoracic spine pain throughout the thoracic spine upper and mid thoracic spine status post T8 fracture.  Treated nonoperatively.  Appears to be a burst fracture on prior MRI resulting in moderate central stenosis.  May have some progressed spinal stenosis at that level.  Given the radicular symptoms and couple episodes of bladder symptoms need to evaluate for progressive thoracic stenosis.  Pain has improved significantly with physical therapy.     3.  Myofascial pain thoracic spine..     4.  A couple of episodes of urinary retention type symptoms or slow bladder emptying.  No spinal cord compression or spinal stenosis through the thoracic or lumbar spine and MRI or on  imaging of the cervical spine reassurance provided.    Discussion:    1.  We discussed the MRI of the thoracic spine and lumbar spine.  Reassurance provided regarding the findings.  His pain is doing better with physical therapy and we discussed ongoing therapy and home exercise along with other interventions.  No  medication recommendations at this time and he agrees.  Reassurance provided regarding the bladder as well given no central stenosis.    2.  Recommend he continue with physical therapy home exercises    3.  Follow-up with me as needed.         It was our pleasure caring for your patient today, if there any questions or concerns please do not hesitate to contact us.      Subjective:   Patient ID: Itz Jurado is a 44 year old male.    History of Present Illness: Patient presents for follow-up of thoracic spine pain lumbar spine pain.  He has ongoing issues with pain which is markedly improved now with doing physical therapy.  MRI of the thoracic and lumbar spine have been done since last visit.  His thoracic spine pain is doing fairly well with being active but still there with bad posture.  Also low back pain at the lumbosacral junction with bad posture or inactivity.  Better with being active and doing his home exercises, heat.  No medications other than occasional methocarbamol taken once in the past week after a long road trip.  His pain is a 5/10 at worst.  No radiation in the legs paresthesias or weakness.  No longer having any bladder symptoms.      Imaging: MRI report and images were personally reviewed and discussed with the patient.  A plastic model was utilized during the discussion.  MRI of the lumbar and thoracic spine personally reviewed.  Lumbar spine reveals mild broad-based disc bulge at L3-4 without any significant central canal or foraminal stenosis.  Posterior fusion at L4-5.  Normal disc L5-S1 minimal T2 signal change L3-4 with no disc bulging central canal or foraminal stenosis at any level.  There does appear to be facet arthropathy L5-S1 on my review of images.  Also facet arthropathy at L3-4 bilaterally.    MRI thoracic spine shows a T8 severe compression deformity otherwise no significant degenerative change of the disc no central canal foraminal stenosis spinal cord compression.  The T8  fracture is chronic    Review of Systems: Pertinent positives: None.  Pertinent negatives: No numbness, tingling or weakness.  No bowel or bladder incontinence.  No urinary retention.  No fevers, unintentional weight loss, balance changes, headaches, frequent falling, difficulty swallowing, or coordination difficulties.  All others reviewed are negative.         Current Outpatient Medications   Medication Sig Dispense Refill    aspirin (ASA) 81 MG EC tablet Take 81 mg by mouth.      DULoxetine (CYMBALTA) 30 MG capsule Take 1 capsule (30 mg) by mouth 2 times daily. 30 capsule 0    hydrOXYzine HCl (ATARAX) 25 MG tablet Take 1 tablet (25 mg) by mouth 3 times daily as needed (Anxiety or back pain). 30 tablet 0    LORazepam (ATIVAN) 0.5 MG tablet Take 1 tablet (0.5 mg) by mouth every 6 hours as needed for anxiety. 10 tablet 0    methimazole (TAPAZOLE) 5 MG tablet Take 1 tablet (5 mg) by mouth daily. 30 tablet 0    methocarbamol (ROBAXIN) 500 MG tablet       metoprolol succinate ER (TOPROL XL) 25 MG 24 hr tablet Take 1 tablet by mouth daily.      oxyCODONE (ROXICODONE) 5 MG tablet Take 1-2 tablets (5-10 mg) by mouth 3 times daily as needed for moderate to severe pain. 20 tablet 0    prasugrel (EFFIENT) 10 MG TABS tablet Take 1 tablet by mouth daily.      rosuvastatin (CRESTOR) 40 MG tablet       traMADol (ULTRAM) 50 MG tablet Take 1 tablet (50 mg) by mouth 3 times daily as needed for pain. 20 tablet 0    zolpidem (AMBIEN) 10 MG tablet Take 1 tablet (10 mg) by mouth nightly as needed for sleep. 30 tablet 0     No current facility-administered medications for this visit.       Past Medical History:   Diagnosis Date    DDD (degenerative disc disease), lumbar 04/11/2023    Epidural hematoma (H) 03/20/2023       The following portions of the patient's history were reviewed and updated as appropriate: allergies, current medications, past family history, past medical history, past social history, past surgical history and  problem list.           Objective:   Physical Exam:    /67   Pulse 75   There is no height or weight on file to calculate BMI.      General: Alert and oriented with normal affect. Attention, knowledge, memory, and language are intact. No acute distress.   Eyes: Sclerae are clear.  Respirations: Unlabored. CV: No lower extremity edema.  Skin: No rashes seen over the legs.    Gait:  Nonantalgic  Full lumbar flexion finger to floor testing.  Slight kyphosis mid thoracic spine.  No tenderness over the spinous process thoracic and lumbar spine or paraspinal tissues.  Sensation is intact to light touch throughout the   lower extremities.  Reflexes are 2+ patellar and Achilles      Manual muscle testing reveals:  Right /Left out of 5     5/5 hip flexors  5/5 knee flexors  5/5 knee extensors  5/5 ankle plantar flexors  5/5  ankle evertors

## 2025-06-16 ENCOUNTER — OFFICE VISIT (OUTPATIENT)
Dept: PHYSICAL MEDICINE AND REHAB | Facility: CLINIC | Age: 45
End: 2025-06-16
Payer: COMMERCIAL

## 2025-06-16 VITALS — DIASTOLIC BLOOD PRESSURE: 67 MMHG | SYSTOLIC BLOOD PRESSURE: 130 MMHG | HEART RATE: 75 BPM

## 2025-06-16 DIAGNOSIS — M47.816 LUMBAR FACET ARTHROPATHY: ICD-10-CM

## 2025-06-16 DIAGNOSIS — M54.50 LUMBAR SPINE PAIN: Primary | ICD-10-CM

## 2025-06-16 DIAGNOSIS — Z98.1 HISTORY OF LUMBAR FUSION: ICD-10-CM

## 2025-06-16 DIAGNOSIS — M79.18 MYOFASCIAL PAIN: ICD-10-CM

## 2025-06-16 DIAGNOSIS — M54.6 THORACIC SPINE PAIN: ICD-10-CM

## 2025-06-16 DIAGNOSIS — S22.069S CLOSED FRACTURE OF EIGHTH THORACIC VERTEBRA, UNSPECIFIED FRACTURE MORPHOLOGY, SEQUELA: ICD-10-CM

## 2025-06-16 PROCEDURE — 99214 OFFICE O/P EST MOD 30 MIN: CPT | Performed by: PHYSICAL MEDICINE & REHABILITATION

## 2025-06-16 PROCEDURE — 1126F AMNT PAIN NOTED NONE PRSNT: CPT | Performed by: PHYSICAL MEDICINE & REHABILITATION

## 2025-06-16 PROCEDURE — 3078F DIAST BP <80 MM HG: CPT | Performed by: PHYSICAL MEDICINE & REHABILITATION

## 2025-06-16 PROCEDURE — 3075F SYST BP GE 130 - 139MM HG: CPT | Performed by: PHYSICAL MEDICINE & REHABILITATION

## 2025-06-16 ASSESSMENT — PAIN SCALES - GENERAL: PAINLEVEL_OUTOF10: NO PAIN (0)

## 2025-06-16 NOTE — LETTER
6/16/2025      Itz Jurado  [address]  [place] [address]      Dear Colleague,    Thank you for referring your patient, Itz Jurado, to the [facility]. Please see a copy of my visit note below.    Assessment/Plan:      Boris was seen today for back pain.    Diagnoses and all orders for this visit:    Lumbar spine pain    Lumbar facet arthropathy    History of lumbar fusion    Myofascial pain    Thoracic spine pain    Closed fracture of eighth thoracic vertebra, unspecified fracture morphology, sequela         Assessment: Pleasant 44 year old male with history of coronary artery disease with recent stent placement, hypertension, hyperlipidemia, status post L4-5 fusion and T8 fracture with:     1.  Chronic lumbar spine pain with progressive approximate 5-month history of painful lumbosacral junction mid lumbar spine with intermittent lower extremity radicular pain.   History of L4-5 fusion.  Mild degenerative changes at L4-5 above the fusion with annular tear and facet arthropathy.  No high-grade central stenosis through the lumbar spine.  Overall doing fairly well with physical therapy exercises and being more active.     2.  Thoracic spine pain throughout the thoracic spine upper and mid thoracic spine status post T8 fracture.  Treated nonoperatively.  Appears to be a burst fracture on prior MRI resulting in moderate central stenosis.  May have some progressed spinal stenosis at that level.  Given the radicular symptoms and couple episodes of bladder symptoms need to evaluate for progressive thoracic stenosis.  Pain has improved significantly with physical therapy.     3.  Myofascial pain thoracic spine..     4.  A couple of episodes of urinary retention type symptoms or slow bladder emptying.  No spinal cord compression or spinal stenosis through the thoracic or lumbar spine and MRI or on  imaging of the cervical spine reassurance provided.    Discussion:    1.   We discussed the MRI of the thoracic spine and lumbar spine.  Reassurance provided regarding the findings.  His pain is doing better with physical therapy and we discussed ongoing therapy and home exercise along with other interventions.  No medication recommendations at this time and he agrees.  Reassurance provided regarding the bladder as well given no central stenosis.    2.  Recommend he continue with physical therapy home exercises    3.  Follow-up with me as needed.         It was our pleasure caring for your patient today, if there any questions or concerns please do not hesitate to contact us.      Subjective:   Patient ID: Itz Jurado is a 44 year old male.    History of Present Illness: Patient presents for follow-up of thoracic spine pain lumbar spine pain.  He has ongoing issues with pain which is markedly improved now with doing physical therapy.  MRI of the thoracic and lumbar spine have been done since last visit.  His thoracic spine pain is doing fairly well with being active but still there with bad posture.  Also low back pain at the lumbosacral junction with bad posture or inactivity.  Better with being active and doing his home exercises, heat.  No medications other than occasional methocarbamol taken once in the past week after a long road trip.  His pain is a 5/10 at worst.  No radiation in the legs paresthesias or weakness.  No longer having any bladder symptoms.      Imaging: MRI report and images were personally reviewed and discussed with the patient.  A plastic model was utilized during the discussion.  MRI of the lumbar and thoracic spine personally reviewed.  Lumbar spine reveals mild broad-based disc bulge at L3-4 without any significant central canal or foraminal stenosis.  Posterior fusion at L4-5.  Normal disc L5-S1 minimal T2 signal change L3-4 with no disc bulging central canal or foraminal stenosis at any level.  There does appear to be facet arthropathy L5-S1 on my review  of images.  Also facet arthropathy at L3-4 bilaterally.    MRI thoracic spine shows a T8 severe compression deformity otherwise no significant degenerative change of the disc no central canal foraminal stenosis spinal cord compression.  The T8 fracture is chronic    Review of Systems: Pertinent positives: None.  Pertinent negatives: No numbness, tingling or weakness.  No bowel or bladder incontinence.  No urinary retention.  No fevers, unintentional weight loss, balance changes, headaches, frequent falling, difficulty swallowing, or coordination difficulties.  All others reviewed are negative.         Current Outpatient Medications   Medication Sig Dispense Refill     aspirin (ASA) 81 MG EC tablet Take 81 mg by mouth.       DULoxetine (CYMBALTA) 30 MG capsule Take 1 capsule (30 mg) by mouth 2 times daily. 30 capsule 0     hydrOXYzine HCl (ATARAX) 25 MG tablet Take 1 tablet (25 mg) by mouth 3 times daily as needed (Anxiety or back pain). 30 tablet 0     LORazepam (ATIVAN) 0.5 MG tablet Take 1 tablet (0.5 mg) by mouth every 6 hours as needed for anxiety. 10 tablet 0     methimazole (TAPAZOLE) 5 MG tablet Take 1 tablet (5 mg) by mouth daily. 30 tablet 0     methocarbamol (ROBAXIN) 500 MG tablet        metoprolol succinate ER (TOPROL XL) 25 MG 24 hr tablet Take 1 tablet by mouth daily.       oxyCODONE (ROXICODONE) 5 MG tablet Take 1-2 tablets (5-10 mg) by mouth 3 times daily as needed for moderate to severe pain. 20 tablet 0     prasugrel (EFFIENT) 10 MG TABS tablet Take 1 tablet by mouth daily.       rosuvastatin (CRESTOR) 40 MG tablet        traMADol (ULTRAM) 50 MG tablet Take 1 tablet (50 mg) by mouth 3 times daily as needed for pain. 20 tablet 0     zolpidem (AMBIEN) 10 MG tablet Take 1 tablet (10 mg) by mouth nightly as needed for sleep. 30 tablet 0     No current facility-administered medications for this visit.       Past Medical History:   Diagnosis Date     DDD (degenerative disc disease), lumbar 04/11/2023      Epidural hematoma (H) 03/20/2023       The following portions of the patient's history were reviewed and updated as appropriate: allergies, current medications, past family history, past medical history, past social history, past surgical history and problem list.           Objective:   Physical Exam:    /67   Pulse 75   There is no height or weight on file to calculate BMI.      General: Alert and oriented with normal affect. Attention, knowledge, memory, and language are intact. No acute distress.   Eyes: Sclerae are clear.  Respirations: Unlabored. CV: No lower extremity edema.  Skin: No rashes seen over the legs.    Gait:  Nonantalgic  Full lumbar flexion finger to floor testing.  Slight kyphosis mid thoracic spine.  No tenderness over the spinous process thoracic and lumbar spine or paraspinal tissues.  Sensation is intact to light touch throughout the   lower extremities.  Reflexes are 2+ patellar and Achilles      Manual muscle testing reveals:  Right /Left out of 5     5/5 hip flexors  5/5 knee flexors  5/5 knee extensors  5/5 ankle plantar flexors  5/5  ankle evertors    Again, thank you for allowing me to participate in the care of your patient.        Sincerely,        Mu Guy DO    Electronically signed

## 2025-06-19 ENCOUNTER — THERAPY VISIT (OUTPATIENT)
Dept: PHYSICAL THERAPY | Facility: CLINIC | Age: 45
End: 2025-06-19
Payer: COMMERCIAL

## 2025-06-19 DIAGNOSIS — G89.29 CHRONIC BILATERAL LOW BACK PAIN WITHOUT SCIATICA: Primary | ICD-10-CM

## 2025-06-19 DIAGNOSIS — M54.50 CHRONIC BILATERAL LOW BACK PAIN WITHOUT SCIATICA: Primary | ICD-10-CM

## 2025-06-19 NOTE — PROGRESS NOTES
06/19/25 0500   Appointment Info   Signing clinician's name / credentials Johan Goodwin DPT   Total/Authorized Visits 8 [employer]   Visits Used 3   Medical Diagnosis Lumbar spine pain  Lumbar radicular pain  Closed fracture of eighth thoracic vertebra, unspecified fracture morphology, sequela  History of lumbar fusion   PT Tx Diagnosis chronic low back pain   Progress Note/Certification   Onset of illness/injury or Date of Surgery 05/12/25   Therapy Frequency 1 x/ week   Predicted Duration 8 weeks   Progress Note Due Date 07/11/25   Progress Note Completed Date 05/16/25       Present No   PT Goal 1   Goal Identifier lifting   Goal Description pt will be able to lift 40lbs from the floor painfree   Rationale to maximize safety and independence with performance of ADLs and functional tasks   Goal Progress liftng up to 30lbs off the floor with 1/10 PL   Target Date 07/11/25   Subjective Report   Subjective Report pt reports the more he has been active things have been improving. with the cardiac stuff has been mainly focusing on that. T7 fused to T8 based on MRI, Low back fusion is still solid. feels he is in a good place with all his rehab and just wants to focus on cardiac.   Objective Measures   Objective Measures Objective Measure 1;Objective Measure 2;Objective Measure 3   Objective Measure 1   Objective Measure Lumbar ROM   Details flexion hands to ankle, extension mild limitation no pain, SB mild limtiation bilat with central low back pain   Objective Measure 2   Objective Measure hip strength / ROM   Details normal   Objective Measure 3   Objective Measure myotomes / dermatomes   Details normal   Treatment Interventions (PT)   Interventions Therapeutic Procedure/Exercise   Therapeutic Procedure/Exercise   Therapeutic Procedures: strength, endurance, ROM, flexibility minutes (63198) 32   PTRx Ther Proc 1 Hip Hinge Bar Slide Down Thighs   PTRx Ther Proc 1 - Details 20lbs x 15 x 10 x 10     PTRx Ther Proc 2 Kettlebell Pendlay Row   PTRx Ther Proc 2 - Details x15 reviewed form.    PTRx Ther Proc 3 Suspension Row   PTRx Ther Proc 3 - Details reviewed TRX strengthening focus on slow eccentric control x 20    PTRx Ther Proc 4 Supine Abdominal Exercise #6 (Dead Bug)   PTRx Ther Proc 4 - Details No Notes   PTRx Ther Proc 5 Supine Abdominal Exercise #7A (Arm Extension with Legs at 90/90)   PTRx Ther Proc 5 - Details No Notes   PTRx Ther Proc 6 Supine Abdominal Exercise #8 (Toe Taps)   PTRx Ther Proc 6 - Details No Notes   PTRx Ther Proc 7 Supine Abdominal Exercise #10 (Bilateral Arm and Leg Extension)   PTRx Ther Proc 7 - Details No Notes   PTRx Ther Proc 8 Suspension Row   PTRx Ther Proc 8 - Details demonstrated with high weight  on cable 4 plates x [id number]   PTRx Ther Proc 9 Standing Theraband T's   PTRx Ther Proc 9 - Details red x 12x 10 x 10  gave weight as option as well but with the low back issue prefers standing.    PTRx Ther Proc 10 Hip Hinge Bar Slide Down Thighs   PTRx Ther Proc 10 - Details 20lbs x 15 x 10 x 10    Skilled Intervention reviewed with pt strength program keeping form and slow eccentrics the focus. limited up to 15lbs of weight at home so educated on ways to make exercises harder. focus on compound lifts to get the most out of each exercise yet saving time. finalized program reviewed form and educated on how to progress.   Patient Response/Progress pt tolerated   Education   Learner/Method Patient;Demonstration;Pictures/Video;No Barriers to Learning   Plan   Home program PTRX   Updates to plan of care DC with HEP   Total Session Time   Timed Code Treatment Minutes 32   Total Treatment Time (sum of timed and untimed services) 32         DISCHARGE  Reason for Discharge: Patient chooses to discontinue therapy.  Happy with strength plan and independent with how to progress.     Equipment Issued:     Discharge Plan: Patient to continue home program.    Referring Provider:  Mu  Nicolasgers

## 2025-06-26 ENCOUNTER — LAB (OUTPATIENT)
Dept: LAB | Facility: CLINIC | Age: 45
End: 2025-06-26
Payer: COMMERCIAL

## 2025-06-26 DIAGNOSIS — E05.90 HYPERTHYROIDISM: ICD-10-CM

## 2025-06-26 DIAGNOSIS — E05.00 GRAVES DISEASE: ICD-10-CM

## 2025-06-26 LAB
ALBUMIN SERPL BCG-MCNC: 4.2 G/DL (ref 3.5–5.2)
ALP SERPL-CCNC: 166 U/L (ref 40–150)
ALT SERPL W P-5'-P-CCNC: 40 U/L (ref 0–70)
AST SERPL W P-5'-P-CCNC: 28 U/L (ref 0–45)
BILIRUB SERPL-MCNC: 0.6 MG/DL
BILIRUBIN DIRECT (ROCHE PRO & PURE): 0.26 MG/DL (ref 0–0.45)
PROT SERPL-MCNC: 6.7 G/DL (ref 6.4–8.3)
T3 SERPL-MCNC: 214 NG/DL (ref 85–202)
T4 FREE SERPL-MCNC: 1.69 NG/DL (ref 0.9–1.7)
TSH SERPL DL<=0.005 MIU/L-ACNC: <0.01 UIU/ML (ref 0.3–4.2)

## 2025-07-02 LAB — TSI SER-ACNC: 2.3 TSI INDEX

## 2025-07-30 ENCOUNTER — LAB (OUTPATIENT)
Dept: LAB | Facility: CLINIC | Age: 45
End: 2025-07-30
Payer: COMMERCIAL

## 2025-07-30 DIAGNOSIS — E05.90 HYPERTHYROIDISM: ICD-10-CM

## 2025-07-30 LAB
ALBUMIN SERPL BCG-MCNC: 4.4 G/DL (ref 3.5–5.2)
ALP SERPL-CCNC: 192 U/L (ref 40–150)
ALT SERPL W P-5'-P-CCNC: 29 U/L (ref 0–70)
AST SERPL W P-5'-P-CCNC: 24 U/L (ref 0–45)
BILIRUB SERPL-MCNC: 0.5 MG/DL
BILIRUBIN DIRECT (ROCHE PRO & PURE): 0.21 MG/DL (ref 0–0.45)
PROT SERPL-MCNC: 6.8 G/DL (ref 6.4–8.3)
T4 FREE SERPL-MCNC: 1.13 NG/DL (ref 0.9–1.7)
TSH SERPL DL<=0.005 MIU/L-ACNC: <0.01 UIU/ML (ref 0.3–4.2)

## 2025-07-31 LAB — T3 SERPL-MCNC: 149 NG/DL (ref 85–202)

## 2025-08-06 ENCOUNTER — OFFICE VISIT (OUTPATIENT)
Dept: ENDOCRINOLOGY | Facility: CLINIC | Age: 45
End: 2025-08-06
Payer: COMMERCIAL

## 2025-08-06 VITALS
HEART RATE: 50 BPM | BODY MASS INDEX: 25.48 KG/M2 | DIASTOLIC BLOOD PRESSURE: 62 MMHG | SYSTOLIC BLOOD PRESSURE: 114 MMHG | WEIGHT: 206 LBS

## 2025-08-06 DIAGNOSIS — E05.00 GRAVES DISEASE: ICD-10-CM

## 2025-08-06 DIAGNOSIS — R73.9 HYPERGLYCEMIA: ICD-10-CM

## 2025-08-06 DIAGNOSIS — E05.90 HYPERTHYROIDISM: Primary | ICD-10-CM

## 2025-08-06 RX ORDER — METHIMAZOLE 5 MG/1
10 TABLET ORAL DAILY
Qty: 60 TABLET | Refills: 2 | Status: SHIPPED | OUTPATIENT
Start: 2025-08-06

## 2025-08-10 ENCOUNTER — HEALTH MAINTENANCE LETTER (OUTPATIENT)
Age: 45
End: 2025-08-10

## 2025-08-13 ENCOUNTER — PATIENT OUTREACH (OUTPATIENT)
Dept: CARE COORDINATION | Facility: CLINIC | Age: 45
End: 2025-08-13
Payer: COMMERCIAL